# Patient Record
Sex: FEMALE | HISPANIC OR LATINO | Employment: FULL TIME | URBAN - NONMETROPOLITAN AREA
[De-identification: names, ages, dates, MRNs, and addresses within clinical notes are randomized per-mention and may not be internally consistent; named-entity substitution may affect disease eponyms.]

---

## 2020-09-16 ENCOUNTER — OFFICE VISIT (OUTPATIENT)
Dept: OTOLARYNGOLOGY | Facility: CLINIC | Age: 25
End: 2020-09-16

## 2020-09-16 VITALS
WEIGHT: 172.6 LBS | DIASTOLIC BLOOD PRESSURE: 70 MMHG | HEART RATE: 65 BPM | BODY MASS INDEX: 29.63 KG/M2 | SYSTOLIC BLOOD PRESSURE: 121 MMHG | TEMPERATURE: 97.4 F

## 2020-09-16 DIAGNOSIS — D48.9 NEOPLASM OF UNCERTAIN BEHAVIOR: Primary | ICD-10-CM

## 2020-09-16 PROCEDURE — 99203 OFFICE O/P NEW LOW 30 MIN: CPT | Performed by: NURSE PRACTITIONER

## 2020-09-16 RX ORDER — BUSPIRONE HYDROCHLORIDE 5 MG/1
5 TABLET ORAL 2 TIMES DAILY
COMMUNITY
Start: 2020-08-07 | End: 2021-12-21

## 2020-09-16 RX ORDER — DIPHENOXYLATE HYDROCHLORIDE AND ATROPINE SULFATE 2.5; .025 MG/1; MG/1
TABLET ORAL
COMMUNITY
Start: 2020-08-20 | End: 2021-12-21

## 2020-09-16 RX ORDER — CHOLECALCIFEROL (VITAMIN D3) 1250 MCG
CAPSULE ORAL
COMMUNITY
Start: 2020-09-09 | End: 2021-12-21

## 2020-09-16 RX ORDER — OMEPRAZOLE 20 MG/1
40 CAPSULE, DELAYED RELEASE ORAL DAILY
COMMUNITY
Start: 2020-09-10 | End: 2022-04-29 | Stop reason: SDUPTHER

## 2020-10-01 NOTE — PROGRESS NOTES
PRIMARY CARE PROVIDER: Hiral Manzo MD  REFERRING PROVIDER: No ref. provider found    Chief Complaint   Patient presents with   • Skin Lesion       Subjective   History of Present Illness:  Adelaida Mendez is a  24 y.o. female who presents for consultation regarding a skin lesion of the left jawline.  The lesion has the following characteristics:    Location: left jawline  Quality: raised, enlarging  Severity: moderate  Duration: many years  Timing: constant  Modifying Factors: none  Associated Signs & Symptoms: no change in color, no bleeding, no lymphadenopathy    Review of Systems:  Review of Systems   Constitutional: Negative for chills and fever.   HENT: Negative for congestion, ear discharge, ear pain, rhinorrhea and sore throat.    Respiratory: Negative for cough and shortness of breath.    Cardiovascular: Negative for chest pain.   Gastrointestinal: Negative for diarrhea, nausea and vomiting.   Hematological: Does not bruise/bleed easily.       Past History:  History reviewed. No pertinent past medical history.  Past Surgical History:   Procedure Laterality Date   • KIDNEY SURGERY      Removal   • MYRINGOTOMY W/ TUBES       Family History   Problem Relation Age of Onset   • Diabetes Father    • Hyperlipidemia Father      Social History     Tobacco Use   • Smoking status: Never Smoker   • Smokeless tobacco: Never Used   Substance Use Topics   • Alcohol use: Yes   • Drug use: Defer     Allergies:  Patient has no known allergies.    Current Outpatient Medications:   •  busPIRone (BUSPAR) 5 MG tablet, Take 5 mg by mouth 2 (Two) Times a Day., Disp: , Rfl:   •  Cholecalciferol (Vitamin D3) 1.25 MG (18933 UT) capsule, TK 1 C PO ONCE A WEEK, Disp: , Rfl:   •  diphenoxylate-atropine (LOMOTIL) 2.5-0.025 MG per tablet, TAKE ONE TABLET BY MOUTH AFTER LOOSE STOOL PRN, Disp: , Rfl:   •  omeprazole (priLOSEC) 20 MG capsule, TK 1 C PO QD, Disp: , Rfl:     Objective     Vital Signs:    /70   Pulse 65   Temp  97.4 °F (36.3 °C) (Temporal)   Wt 78.3 kg (172 lb 9.6 oz)   BMI 29.63 kg/m²     Physical Exam:  Physical Exam  Vitals signs and nursing note reviewed.   Constitutional:       General: She is not in acute distress.     Appearance: She is well-developed. She is not diaphoretic.   HENT:      Head: Normocephalic and atraumatic.        Right Ear: External ear normal.      Left Ear: External ear normal.      Nose: Nose normal.   Eyes:      General: No scleral icterus.        Right eye: No discharge.         Left eye: No discharge.      Conjunctiva/sclera: Conjunctivae normal.      Pupils: Pupils are equal, round, and reactive to light.   Neck:      Musculoskeletal: Normal range of motion and neck supple.      Thyroid: No thyromegaly.      Vascular: No JVD.      Trachea: No tracheal deviation.   Pulmonary:      Effort: Pulmonary effort is normal.      Breath sounds: No stridor.   Musculoskeletal: Normal range of motion.         General: No deformity.   Lymphadenopathy:      Cervical: No cervical adenopathy.   Skin:     General: Skin is warm and dry.      Coloration: Skin is not pale.      Findings: No erythema or rash.   Neurological:      Mental Status: She is alert and oriented to person, place, and time.      Cranial Nerves: No cranial nerve deficit.      Coordination: Coordination normal.   Psychiatric:         Speech: Speech normal.         Behavior: Behavior normal. Behavior is cooperative.         Thought Content: Thought content normal.         Judgment: Judgment normal.               Assessment   Assessment:  1. Neoplasm of uncertain behavior        Plan   Plan:    I have offered excision of the skin lesion of the left jawline in the OR under sedation with permanent section analysis and likely linear closure.    Discussion of skin lesion. Discussed risks, benefits, alternatives, and possible complications of excision of the skin lesion with reconstruction utilizing local tissue rearrangement, full-thickness skin  grafting, or local interpolated flaps. Risks include, but are not limited too: bleeding, infection, hematoma, recurrence, need for additional procedures, flap failure, cosmetic deformity. Patient understands risks and would like to proceed with surgery.     My findings and recommendations were discussed and questions were answered.     Babs Uribe, APRN

## 2020-10-02 PROBLEM — D48.9 NEOPLASM OF UNCERTAIN BEHAVIOR: Status: ACTIVE | Noted: 2020-10-02

## 2020-11-05 ENCOUNTER — APPOINTMENT (OUTPATIENT)
Dept: PREADMISSION TESTING | Facility: HOSPITAL | Age: 25
End: 2020-11-05

## 2020-11-05 ENCOUNTER — TRANSCRIBE ORDERS (OUTPATIENT)
Dept: ADMINISTRATIVE | Facility: HOSPITAL | Age: 25
End: 2020-11-05

## 2020-11-05 VITALS
BODY MASS INDEX: 30.7 KG/M2 | HEIGHT: 63 IN | SYSTOLIC BLOOD PRESSURE: 125 MMHG | WEIGHT: 173.28 LBS | HEART RATE: 76 BPM | DIASTOLIC BLOOD PRESSURE: 75 MMHG | OXYGEN SATURATION: 98 % | RESPIRATION RATE: 16 BRPM

## 2020-11-05 DIAGNOSIS — Z11.59 SCREENING FOR VIRAL DISEASE: Primary | ICD-10-CM

## 2020-11-05 LAB
DEPRECATED RDW RBC AUTO: 36.8 FL (ref 37–54)
ERYTHROCYTE [DISTWIDTH] IN BLOOD BY AUTOMATED COUNT: 12.9 % (ref 12.3–15.4)
HCT VFR BLD AUTO: 39.4 % (ref 34–46.6)
HGB BLD-MCNC: 13.6 G/DL (ref 12–15.9)
MCH RBC QN AUTO: 27.6 PG (ref 26.6–33)
MCHC RBC AUTO-ENTMCNC: 34.5 G/DL (ref 31.5–35.7)
MCV RBC AUTO: 79.9 FL (ref 79–97)
PLATELET # BLD AUTO: 315 10*3/MM3 (ref 140–450)
PMV BLD AUTO: 9.7 FL (ref 6–12)
RBC # BLD AUTO: 4.93 10*6/MM3 (ref 3.77–5.28)
WBC # BLD AUTO: 5.87 10*3/MM3 (ref 3.4–10.8)

## 2020-11-05 PROCEDURE — 36415 COLL VENOUS BLD VENIPUNCTURE: CPT

## 2020-11-05 PROCEDURE — 85027 COMPLETE CBC AUTOMATED: CPT | Performed by: OTOLARYNGOLOGY

## 2020-11-05 RX ORDER — VORTIOXETINE 10 MG/1
1 TABLET, FILM COATED ORAL DAILY
COMMUNITY
End: 2021-12-21

## 2020-11-05 RX ORDER — ASPIRIN 81 MG/1
81 TABLET, CHEWABLE ORAL AS NEEDED
COMMUNITY
End: 2021-12-21

## 2020-11-05 NOTE — DISCHARGE INSTRUCTIONS
DAY OF SURGERY INSTRUCTIONS        YOUR SURGEON: Dr Aleman    PROCEDURE: Excision of lesion of the left jawline with linear closure    DATE OF SURGERY: Nov, 12, 2020    ARRIVAL TIME: AS DIRECTED BY OFFICE    YOU MAY TAKE THE FOLLOWING MEDICATION(S) THE MORNING OF SURGERY WITH A SIP OF WATER: None    ALL OTHER HOME MEDICATIONS CHECK WITH YOUR DOCTOR                  MANAGING PAIN AFTER SURGERY    We know you are probably wondering what your pain will be like after surgery.  Following surgery it is unrealistic to expect you will not have pain.   Pain is how our bodies let us know that something is wrong or cautions us to be careful.  That said, our goal is to make your pain tolerable.    Methods we may use to treat your pain include (oral or IV medications, PCAs, epidurals, nerve blocks, etc.)   While some procedures require IV pain medications for a short time after surgery, transitioning to pain medications by mouth allows for better management of pain.   Your nurse will encourage you to take oral pain medications whenever possible.  IV medications work almost immediately, but only last a short while.  Taking medications by mouth allows for a more constant level of medication in your blood stream for a longer period of time.      Once your pain is out of control it is harder to get back under control.  It is important you are aware when your next dose of pain medication is due.  If you are admitted, your nurse may write the time of your next dose on the white board in your room to help you remember.      We are interested in your pain and encourage you to inform us about aggravating factors during your visit.   Many times a simple repositioning every few hours can make a big difference.    If your physician says it is okay, do not let your pain prevent you from getting out of bed. Be sure to call your nurse for assistance prior to getting up so you do not fall.      Before surgery, please decide your tolerable pain  goal.  These faces help describe the pain ratings we use on a 0-10 scale.   Be prepared to tell us your goal and whether or not you take pain or anxiety medications at home.      BEFORE YOU COME TO THE HOSPITAL  (Pre-op instructions)  • Do not eat, drink, smoke or chew gum after midnight the night before surgery.  This also includes no mints.  • Morning of surgery take only the medicines you have been instructed with a sip of water unless otherwise instructed  by your physician.  • Do not shave, wear makeup or dark nail polish.  • Remove all jewelry including rings.  • Leave anything you consider valuable at home.  • Leave your suitcase in the car until after your surgery.  • Bring the following with you if applicable:  o Picture ID and insurance, Medicare or Medicaid cards  o Co-pay/deductible required by insurance (cash, check, credit card)  o Copy of advance directive, living will or power-of- documents if not brought to PAT  o CPAP or BIPAP mask and tubing  o Relaxation aids ( book, magazine), etc.  o Hearing aids                                 ON THE DAY OF SURGERY  · On the day of surgery check in at registration located at the main entrance of the hospital.   ? You will be registered and given a beeper with instructions where to wait in the main lobby.  ? When your beeper lights up and vibrates a member of the Outpatient Surgery staff will meet you at the double doors under the stair steps and escort you to your preoperative room.   · You may have cloth compression devices placed on your legs. These help to prevent blood clots and reduce swelling in your legs.  · An IV may be inserted into one of your veins.  · In the operating room, you may be given one or more of the following:  ? A medicine to help you relax (sedative).  ? A medicine to numb the area (local anesthetic).  ? A medicine to make you fall asleep (general anesthetic).  ? A medicine that is injected into an area of your body to numb  "everything below the injection site (regional anesthetic).  · Your surgical site will be marked or identified.  · You may be given an antibiotic through your IV to help prevent infection.  Contact a health care provider if you:  · Develop a fever of more than 100.4°F (38°C) or other feelings of illness during the 48 hours before your surgery.  · Have symptoms that get worse.  Have questions or concerns about your surgery    General Anesthesia/Surgery, Adult  General anesthesia is the use of medicines to make a person \"go to sleep\" (unconscious) for a medical procedure. General anesthesia must be used for certain procedures, and is often recommended for procedures that:  · Last a long time.  · Require you to be still or in an unusual position.  · Are major and can cause blood loss.  The medicines used for general anesthesia are called general anesthetics. As well as making you unconscious for a certain amount of time, these medicines:  · Prevent pain.  · Control your blood pressure.  · Relax your muscles.  Tell a health care provider about:  · Any allergies you have.  · All medicines you are taking, including vitamins, herbs, eye drops, creams, and over-the-counter medicines.  · Any problems you or family members have had with anesthetic medicines.  · Types of anesthetics you have had in the past.  · Any blood disorders you have.  · Any surgeries you have had.  · Any medical conditions you have.  · Any recent upper respiratory, chest, or ear infections.  · Any history of:  ? Heart or lung conditions, such as heart failure, sleep apnea, asthma, or chronic obstructive pulmonary disease (COPD).  ?  service.  ? Depression or anxiety.  · Any tobacco or drug use, including marijuana or alcohol use.  · Whether you are pregnant or may be pregnant.  What are the risks?  Generally, this is a safe procedure. However, problems may occur, including:  · Allergic reaction.  · Lung and heart problems.  · Inhaling food or " liquid from the stomach into the lungs (aspiration).  · Nerve injury.  · Air in the bloodstream, which can lead to stroke.  · Extreme agitation or confusion (delirium) when you wake up from the anesthetic.  · Waking up during your procedure and being unable to move. This is rare.  These problems are more likely to develop if you are having a major surgery or if you have an advanced or serious medical condition. You can prevent some of these complications by answering all of your health care provider's questions thoroughly and by following all instructions before your procedure.  General anesthesia can cause side effects, including:  · Nausea or vomiting.  · A sore throat from the breathing tube.  · Hoarseness.  · Wheezing or coughing.  · Shaking chills.  · Tiredness.  · Body aches.  · Anxiety.  · Sleepiness or drowsiness.  · Confusion or agitation.  RISKS AND COMPLICATIONS OF SURGERY  Your health care provider will discuss possible risks and complications with you before surgery. Common risks and complications include:    · Problems due to the use of anesthetics.  · Blood loss and replacement (does not apply to minor surgical procedures).  · Temporary increase in pain due to surgery.  · Uncorrected pain or problems that the surgery was meant to correct.  · Infection.  · New damage.    What happens before the procedure?    Medicines  Ask your health care provider about:  · Changing or stopping your regular medicines. This is especially important if you are taking diabetes medicines or blood thinners.  · Taking medicines such as aspirin and ibuprofen. These medicines can thin your blood. Do not take these medicines unless your health care provider tells you to take them.  · Taking over-the-counter medicines, vitamins, herbs, and supplements. Do not take these during the week before your procedure unless your health care provider approves them.  General instructions  · Starting 3-6 weeks before the procedure, do not  use any products that contain nicotine or tobacco, such as cigarettes and e-cigarettes. If you need help quitting, ask your health care provider.  · If you brush your teeth on the morning of the procedure, make sure to spit out all of the toothpaste.  · Tell your health care provider if you become ill or develop a cold, cough, or fever.  · If instructed by your health care provider, bring your sleep apnea device with you on the day of your surgery (if applicable).  · Ask your health care provider if you will be going home the same day, the following day, or after a longer hospital stay.  ? Plan to have someone take you home from the hospital or clinic.  ? Plan to have a responsible adult care for you for at least 24 hours after you leave the hospital or clinic. This is important.  What happens during the procedure?  · You will be given anesthetics through both of the following:  ? A mask placed over your nose and mouth.  ? An IV in one of your veins.  · You may receive a medicine to help you relax (sedative).  · After you are unconscious, a breathing tube may be inserted down your throat to help you breathe. This will be removed before you wake up.  · An anesthesia specialist will stay with you throughout your procedure. He or she will:  ? Keep you comfortable and safe by continuing to give you medicines and adjusting the amount of medicine that you get.  ? Monitor your blood pressure, pulse, and oxygen levels to make sure that the anesthetics do not cause any problems.  The procedure may vary among health care providers and hospitals.  What happens after the procedure?  · Your blood pressure, temperature, heart rate, breathing rate, and blood oxygen level will be monitored until the medicines you were given have worn off.  · You will wake up in a recovery area. You may wake up slowly.  · If you feel anxious or agitated, you may be given medicine to help you calm down.  · If you will be going home the same day, your  health care provider may check to make sure you can walk, drink, and urinate.  · Your health care provider will treat any pain or side effects you have before you go home.  · Do not drive for 24 hours if you were given a sedative.  Summary  · General anesthesia is used to keep you still and prevent pain during a procedure.  · It is important to tell your healthcare provider about your medical history and any surgeries you have had, and previous experience with anesthesia.  · Follow your healthcare provider’s instructions about when to stop eating, drinking, or taking certain medicines before your procedure.  · Plan to have someone take you home from the hospital or clinic.  This information is not intended to replace advice given to you by your health care provider. Make sure you discuss any questions you have with your health care provider.  Document Released: 03/26/2009 Document Revised: 08/03/2018 Document Reviewed: 08/03/2018  [employer] Interactive Patient Education © 2019 [employer]      Fall Prevention in Hospitals, Adult  As a hospital patient, your condition and the treatments you receive can increase your risk for falls. Some additional risk factors for falls in a hospital include:  · Being in an unfamiliar environment.  · Being on bed rest.  · Your surgery.  · Taking certain medicines.  · Your tubing requirements, such as intravenous (IV) therapy or catheters.  It is important that you learn how to decrease fall risks while at the hospital. Below are important tips that can help prevent falls.  SAFETY TIPS FOR PREVENTING FALLS  Talk about your risk of falling.  · Ask your health care provider why you are at risk for falling. Is it your medicine, illness, tubing placement, or something else?  · Make a plan with your health care provider to keep you safe from falls.  · Ask your health care provider or pharmacist about side effects of your medicines. Some medicines can make you dizzy or affect your  coordination.  Ask for help.  · Ask for help before getting out of bed. You may need to press your call button.  · Ask for assistance in getting safely to the toilet.  · Ask for a walker or cane to be put at your bedside. Ask that most of the side rails on your bed be placed up before your health care provider leaves the room.  · Ask family or friends to sit with you.  · Ask for things that are out of your reach, such as your glasses, hearing aids, telephone, bedside table, or call button.  Follow these tips to avoid falling:  · Stay lying or seated, rather than standing, while waiting for help.  · Wear rubber-soled slippers or shoes whenever you walk in the hospital.  · Avoid quick, sudden movements.  ¨ Change positions slowly.  ¨ Sit on the side of your bed before standing.  ¨ Stand up slowly and wait before you start to walk.  · Let your health care provider know if there is a spill on the floor.  · Pay careful attention to the medical equipment, electrical cords, and tubes around you.  · When you need help, use your call button by your bed or in the bathroom. Wait for one of your health care providers to help you.  · If you feel dizzy or unsure of your footing, return to bed and wait for assistance.  · Avoid being distracted by the TV, telephone, or another person in your room.  · Do not lean or support yourself on rolling objects, such as IV poles or bedside tables.     This information is not intended to replace advice given to you by your health care provider. Make sure you discuss any questions you have with your health care provider.     Document Released: 12/15/2001 Document Revised: 01/08/2016 Document Reviewed: 08/25/2013  [employer] Interactive Patient Education ©2016 [employer]

## 2020-11-09 ENCOUNTER — LAB (OUTPATIENT)
Dept: LAB | Facility: HOSPITAL | Age: 25
End: 2020-11-09

## 2020-11-09 PROCEDURE — C9803 HOPD COVID-19 SPEC COLLECT: HCPCS | Performed by: OTOLARYNGOLOGY

## 2020-11-09 PROCEDURE — U0003 INFECTIOUS AGENT DETECTION BY NUCLEIC ACID (DNA OR RNA); SEVERE ACUTE RESPIRATORY SYNDROME CORONAVIRUS 2 (SARS-COV-2) (CORONAVIRUS DISEASE [COVID-19]), AMPLIFIED PROBE TECHNIQUE, MAKING USE OF HIGH THROUGHPUT TECHNOLOGIES AS DESCRIBED BY CMS-2020-01-R: HCPCS | Performed by: OTOLARYNGOLOGY

## 2020-11-10 LAB
COVID LABCORP PRIORITY: NORMAL
SARS-COV-2 RNA RESP QL NAA+PROBE: NOT DETECTED

## 2020-11-12 ENCOUNTER — HOSPITAL ENCOUNTER (OUTPATIENT)
Facility: HOSPITAL | Age: 25
Setting detail: HOSPITAL OUTPATIENT SURGERY
Discharge: HOME | End: 2020-11-12
Attending: OTOLARYNGOLOGY | Admitting: OTOLARYNGOLOGY

## 2020-11-12 ENCOUNTER — ANESTHESIA EVENT (OUTPATIENT)
Dept: PERIOP | Facility: HOSPITAL | Age: 25
End: 2020-11-12

## 2020-11-12 ENCOUNTER — ANESTHESIA (OUTPATIENT)
Dept: PERIOP | Facility: HOSPITAL | Age: 25
End: 2020-11-12

## 2020-11-12 VITALS
HEART RATE: 73 BPM | OXYGEN SATURATION: 98 % | SYSTOLIC BLOOD PRESSURE: 130 MMHG | RESPIRATION RATE: 18 BRPM | DIASTOLIC BLOOD PRESSURE: 83 MMHG | TEMPERATURE: 97 F

## 2020-11-12 DIAGNOSIS — D48.9 NEOPLASM OF UNCERTAIN BEHAVIOR: ICD-10-CM

## 2020-11-12 LAB — B-HCG UR QL: NEGATIVE

## 2020-11-12 PROCEDURE — 11441 EXC FACE-MM B9+MARG 0.6-1 CM: CPT | Performed by: OTOLARYNGOLOGY

## 2020-11-12 PROCEDURE — 25010000002 DEXAMETHASONE PER 1 MG: Performed by: ANESTHESIOLOGY

## 2020-11-12 PROCEDURE — 25010000002 ONDANSETRON PER 1 MG: Performed by: NURSE ANESTHETIST, CERTIFIED REGISTERED

## 2020-11-12 PROCEDURE — 25010000002 MIDAZOLAM PER 1 MG: Performed by: ANESTHESIOLOGY

## 2020-11-12 PROCEDURE — 88305 TISSUE EXAM BY PATHOLOGIST: CPT | Performed by: OTOLARYNGOLOGY

## 2020-11-12 PROCEDURE — 81025 URINE PREGNANCY TEST: CPT | Performed by: OTOLARYNGOLOGY

## 2020-11-12 PROCEDURE — 25010000002 PROPOFOL 10 MG/ML EMULSION: Performed by: NURSE ANESTHETIST, CERTIFIED REGISTERED

## 2020-11-12 PROCEDURE — 13131 CMPLX RPR F/C/C/M/N/AX/G/H/F: CPT | Performed by: OTOLARYNGOLOGY

## 2020-11-12 RX ORDER — SODIUM CHLORIDE 0.9 % (FLUSH) 0.9 %
3 SYRINGE (ML) INJECTION AS NEEDED
Status: DISCONTINUED | OUTPATIENT
Start: 2020-11-12 | End: 2020-11-12 | Stop reason: HOSPADM

## 2020-11-12 RX ORDER — ONDANSETRON 2 MG/ML
4 INJECTION INTRAMUSCULAR; INTRAVENOUS ONCE AS NEEDED
Status: DISCONTINUED | OUTPATIENT
Start: 2020-11-12 | End: 2020-11-12 | Stop reason: HOSPADM

## 2020-11-12 RX ORDER — IBUPROFEN 600 MG/1
600 TABLET ORAL EVERY 6 HOURS PRN
Status: DISCONTINUED | OUTPATIENT
Start: 2020-11-12 | End: 2020-11-12 | Stop reason: HOSPADM

## 2020-11-12 RX ORDER — FENTANYL CITRATE 50 UG/ML
25 INJECTION, SOLUTION INTRAMUSCULAR; INTRAVENOUS
Status: DISCONTINUED | OUTPATIENT
Start: 2020-11-12 | End: 2020-11-12 | Stop reason: HOSPADM

## 2020-11-12 RX ORDER — ACETAMINOPHEN 500 MG
1000 TABLET ORAL ONCE
Status: COMPLETED | OUTPATIENT
Start: 2020-11-12 | End: 2020-11-12

## 2020-11-12 RX ORDER — BUPIVACAINE HCL/0.9 % NACL/PF 0.1 %
2 PLASTIC BAG, INJECTION (ML) EPIDURAL ONCE
Status: COMPLETED | OUTPATIENT
Start: 2020-11-12 | End: 2020-11-12

## 2020-11-12 RX ORDER — LABETALOL HYDROCHLORIDE 5 MG/ML
5 INJECTION, SOLUTION INTRAVENOUS
Status: DISCONTINUED | OUTPATIENT
Start: 2020-11-12 | End: 2020-11-12 | Stop reason: HOSPADM

## 2020-11-12 RX ORDER — SODIUM CHLORIDE, SODIUM LACTATE, POTASSIUM CHLORIDE, CALCIUM CHLORIDE 600; 310; 30; 20 MG/100ML; MG/100ML; MG/100ML; MG/100ML
100 INJECTION, SOLUTION INTRAVENOUS CONTINUOUS
Status: DISCONTINUED | OUTPATIENT
Start: 2020-11-12 | End: 2020-11-12 | Stop reason: HOSPADM

## 2020-11-12 RX ORDER — EPHEDRINE SULFATE 50 MG/ML
INJECTION, SOLUTION INTRAVENOUS AS NEEDED
Status: DISCONTINUED | OUTPATIENT
Start: 2020-11-12 | End: 2020-11-12 | Stop reason: SURG

## 2020-11-12 RX ORDER — DEXAMETHASONE SODIUM PHOSPHATE 4 MG/ML
4 INJECTION, SOLUTION INTRA-ARTICULAR; INTRALESIONAL; INTRAMUSCULAR; INTRAVENOUS; SOFT TISSUE ONCE AS NEEDED
Status: COMPLETED | OUTPATIENT
Start: 2020-11-12 | End: 2020-11-12

## 2020-11-12 RX ORDER — NALOXONE HCL 0.4 MG/ML
0.4 VIAL (ML) INJECTION AS NEEDED
Status: DISCONTINUED | OUTPATIENT
Start: 2020-11-12 | End: 2020-11-12 | Stop reason: HOSPADM

## 2020-11-12 RX ORDER — LIDOCAINE HYDROCHLORIDE AND EPINEPHRINE 10; 10 MG/ML; UG/ML
INJECTION, SOLUTION INFILTRATION; PERINEURAL AS NEEDED
Status: DISCONTINUED | OUTPATIENT
Start: 2020-11-12 | End: 2020-11-12 | Stop reason: HOSPADM

## 2020-11-12 RX ORDER — LIDOCAINE HYDROCHLORIDE 10 MG/ML
0.5 INJECTION, SOLUTION EPIDURAL; INFILTRATION; INTRACAUDAL; PERINEURAL ONCE AS NEEDED
Status: DISCONTINUED | OUTPATIENT
Start: 2020-11-12 | End: 2020-11-12 | Stop reason: HOSPADM

## 2020-11-12 RX ORDER — LIDOCAINE HYDROCHLORIDE 10 MG/ML
0.5 INJECTION, SOLUTION EPIDURAL; INFILTRATION; INTRACAUDAL; PERINEURAL ONCE AS NEEDED
Status: DISCONTINUED | OUTPATIENT
Start: 2020-11-12 | End: 2020-11-12 | Stop reason: SDUPTHER

## 2020-11-12 RX ORDER — GINSENG 100 MG
CAPSULE ORAL AS NEEDED
Status: DISCONTINUED | OUTPATIENT
Start: 2020-11-12 | End: 2020-11-12 | Stop reason: HOSPADM

## 2020-11-12 RX ORDER — MIDAZOLAM HYDROCHLORIDE 1 MG/ML
1 INJECTION INTRAMUSCULAR; INTRAVENOUS
Status: DISCONTINUED | OUTPATIENT
Start: 2020-11-12 | End: 2020-11-12 | Stop reason: HOSPADM

## 2020-11-12 RX ORDER — SODIUM CHLORIDE, SODIUM LACTATE, POTASSIUM CHLORIDE, CALCIUM CHLORIDE 600; 310; 30; 20 MG/100ML; MG/100ML; MG/100ML; MG/100ML
1000 INJECTION, SOLUTION INTRAVENOUS CONTINUOUS
Status: DISCONTINUED | OUTPATIENT
Start: 2020-11-12 | End: 2020-11-12 | Stop reason: HOSPADM

## 2020-11-12 RX ORDER — OXYCODONE AND ACETAMINOPHEN 7.5; 325 MG/1; MG/1
2 TABLET ORAL EVERY 4 HOURS PRN
Status: DISCONTINUED | OUTPATIENT
Start: 2020-11-12 | End: 2020-11-12 | Stop reason: HOSPADM

## 2020-11-12 RX ORDER — MAGNESIUM HYDROXIDE 1200 MG/15ML
LIQUID ORAL AS NEEDED
Status: DISCONTINUED | OUTPATIENT
Start: 2020-11-12 | End: 2020-11-12 | Stop reason: HOSPADM

## 2020-11-12 RX ORDER — SODIUM CHLORIDE 0.9 % (FLUSH) 0.9 %
3 SYRINGE (ML) INJECTION EVERY 12 HOURS SCHEDULED
Status: DISCONTINUED | OUTPATIENT
Start: 2020-11-12 | End: 2020-11-12 | Stop reason: HOSPADM

## 2020-11-12 RX ORDER — PROPOFOL 10 MG/ML
VIAL (ML) INTRAVENOUS AS NEEDED
Status: DISCONTINUED | OUTPATIENT
Start: 2020-11-12 | End: 2020-11-12 | Stop reason: SURG

## 2020-11-12 RX ORDER — HYDROCODONE BITARTRATE AND ACETAMINOPHEN 7.5; 325 MG/1; MG/1
1 TABLET ORAL ONCE AS NEEDED
Status: DISCONTINUED | OUTPATIENT
Start: 2020-11-12 | End: 2020-11-12 | Stop reason: HOSPADM

## 2020-11-12 RX ORDER — OXYCODONE AND ACETAMINOPHEN 10; 325 MG/1; MG/1
1 TABLET ORAL ONCE AS NEEDED
Status: DISCONTINUED | OUTPATIENT
Start: 2020-11-12 | End: 2020-11-12 | Stop reason: HOSPADM

## 2020-11-12 RX ORDER — FAMOTIDINE 10 MG/ML
20 INJECTION, SOLUTION INTRAVENOUS
Status: COMPLETED | OUTPATIENT
Start: 2020-11-12 | End: 2020-11-12

## 2020-11-12 RX ORDER — ONDANSETRON 2 MG/ML
INJECTION INTRAMUSCULAR; INTRAVENOUS AS NEEDED
Status: DISCONTINUED | OUTPATIENT
Start: 2020-11-12 | End: 2020-11-12 | Stop reason: SURG

## 2020-11-12 RX ORDER — SODIUM CHLORIDE 0.9 % (FLUSH) 0.9 %
3-10 SYRINGE (ML) INJECTION AS NEEDED
Status: DISCONTINUED | OUTPATIENT
Start: 2020-11-12 | End: 2020-11-12 | Stop reason: HOSPADM

## 2020-11-12 RX ORDER — FLUMAZENIL 0.1 MG/ML
0.2 INJECTION INTRAVENOUS AS NEEDED
Status: DISCONTINUED | OUTPATIENT
Start: 2020-11-12 | End: 2020-11-12 | Stop reason: HOSPADM

## 2020-11-12 RX ORDER — IBUPROFEN 600 MG/1
600 TABLET ORAL ONCE AS NEEDED
Status: DISCONTINUED | OUTPATIENT
Start: 2020-11-12 | End: 2020-11-12 | Stop reason: HOSPADM

## 2020-11-12 RX ADMIN — FAMOTIDINE 20 MG: 10 INJECTION INTRAVENOUS at 08:22

## 2020-11-12 RX ADMIN — SODIUM CHLORIDE, POTASSIUM CHLORIDE, SODIUM LACTATE AND CALCIUM CHLORIDE 1000 ML: 600; 310; 30; 20 INJECTION, SOLUTION INTRAVENOUS at 06:48

## 2020-11-12 RX ADMIN — ONDANSETRON HYDROCHLORIDE 4 MG: 2 SOLUTION INTRAMUSCULAR; INTRAVENOUS at 09:20

## 2020-11-12 RX ADMIN — DEXAMETHASONE SODIUM PHOSPHATE 4 MG: 4 INJECTION, SOLUTION INTRAMUSCULAR; INTRAVENOUS at 08:22

## 2020-11-12 RX ADMIN — DEXMEDETOMIDINE HYDROCHLORIDE 39.32 MCG: 4 INJECTION, SOLUTION INTRAVENOUS at 09:24

## 2020-11-12 RX ADMIN — PROPOFOL 200 MG: 10 INJECTION, EMULSION INTRAVENOUS at 09:20

## 2020-11-12 RX ADMIN — Medication 2 G: at 09:20

## 2020-11-12 RX ADMIN — EPHEDRINE SULFATE 25 MG: 50 INJECTION INTRAVENOUS at 09:24

## 2020-11-12 RX ADMIN — MIDAZOLAM 1 MG: 1 INJECTION INTRAMUSCULAR; INTRAVENOUS at 09:13

## 2020-11-12 RX ADMIN — LIDOCAINE HYDROCHLORIDE 100 MG: 20 INJECTION, SOLUTION INTRAVENOUS at 09:20

## 2020-11-12 RX ADMIN — ACETAMINOPHEN 1000 MG: 500 TABLET, FILM COATED ORAL at 08:22

## 2020-11-12 NOTE — ANESTHESIA PROCEDURE NOTES
Airway  Urgency: elective    Date/Time: 11/12/2020 9:21 AM  Airway not difficult    General Information and Staff    Patient location during procedure: OR  CRNA: MANDA Chapman CRNA    Indications and Patient Condition  Indications for airway management: airway protection    Preoxygenated: yes  MILS not maintained throughout  Mask difficulty assessment: 1 - vent by mask    Final Airway Details  Final airway type: supraglottic airway      Successful airway: classic  Size 4    Number of attempts at approach: 1  Assessment: lips, teeth, and gum same as pre-op and atraumatic intubation

## 2020-11-12 NOTE — H&P
Chief Complaint   Patient presents with   • Skin Lesion            Subjective      History of Present Illness:  Adelaida Mendez is a  24 y.o. female who presents for surgical excision of a skin lesion of the left jawline.  The lesion has the following characteristics:     Location: left jawline  Quality: raised, enlarging  Severity: moderate  Duration: many years  Timing: constant  Modifying Factors: none  Associated Signs & Symptoms: no change in color, no bleeding, no lymphadenopathy     Review of Systems:  Review of Systems   Constitutional: Negative for chills and fever.   HENT: Negative for congestion, ear discharge, ear pain, rhinorrhea and sore throat.    Respiratory: Negative for cough and shortness of breath.    Cardiovascular: Negative for chest pain.   Hematological: Does not bruise/bleed easily.   Skin: No keloid history       /71 (BP Location: Left arm, Patient Position: Sitting)   Pulse 56   Temp 96.8 °F (36 °C) (Temporal)   Resp 18   LMP 11/04/2020 (Exact Date) Comment: neg hcg  SpO2 100%   Breastfeeding No     Past Medical History:   Diagnosis Date   • GERD (gastroesophageal reflux disease)        Past Surgical History:   Procedure Laterality Date   • KIDNEY SURGERY      Removal   • MYRINGOTOMY W/ TUBES         Family History   Problem Relation Age of Onset   • Diabetes Father    • Hyperlipidemia Father        Social History     Socioeconomic History   • Marital status: Single     Spouse name: Not on file   • Number of children: Not on file   • Years of education: Not on file   • Highest education level: Not on file   Tobacco Use   • Smoking status: Never Smoker   • Smokeless tobacco: Never Used   Substance and Sexual Activity   • Alcohol use: Never     Frequency: Never   • Drug use: Never   • Sexual activity: Defer       No Known Allergies      Current Facility-Administered Medications:   •  ceFAZolin in 0.9% normal saline (ANCEF) IVPB solution 2 g, 2 g, Intravenous, Once, Babs Uribe  APRN  •  lactated ringers infusion 1,000 mL, 1,000 mL, Intravenous, Continuous, David Aleman MD, Last Rate: 25 mL/hr at 11/12/20 0648, 1,000 mL at 11/12/20 0648  •  lactated ringers infusion, 100 mL/hr, Intravenous, Continuous, Lizabeth Peña MD  •  lidocaine PF 1% (XYLOCAINE) injection 0.5 mL, 0.5 mL, Intradermal, Once PRN, David Aleman MD  •  midazolam (VERSED) injection 1 mg, 1 mg, Intravenous, Q10 Min PRN, Lizabeth Peña MD  •  sodium chloride 0.9 % flush 3 mL, 3 mL, Intravenous, PRN, David Aleman MD  •  sodium chloride 0.9 % flush 3 mL, 3 mL, Intravenous, Q12H, Lizabeth Peña MD  •  sodium chloride 0.9 % flush 3-10 mL, 3-10 mL, Intravenous, PRN, Lizabeth Peña MD       Allergies:  Patient has no known allergies.     Current Outpatient Medications:   •  busPIRone (BUSPAR) 5 MG tablet, Take 5 mg by mouth 2 (Two) Times a Day., Disp: , Rfl:   •  Cholecalciferol (Vitamin D3) 1.25 MG (44656 UT) capsule, TK 1 C PO ONCE A WEEK, Disp: , Rfl:   •  diphenoxylate-atropine (LOMOTIL) 2.5-0.025 MG per tablet, TAKE ONE TABLET BY MOUTH AFTER LOOSE STOOL PRN, Disp: , Rfl:   •  omeprazole (priLOSEC) 20 MG capsule, TK 1 C PO QD, Disp: , Rfl:         Objective         Vital Signs:  /71 (BP Location: Left arm, Patient Position: Sitting)   Pulse 56   Temp 96.8 °F (36 °C) (Temporal)   Resp 18   LMP 11/04/2020 (Exact Date) Comment: neg hcg  SpO2 100%   Breastfeeding No        Physical Exam:  Physical Exam  Vitals signs and nursing note reviewed.   Constitutional:       General: She is not in acute distress.     Appearance: She is well-developed. She is not diaphoretic.   HENT:      Head: Normocephalic and atraumatic.        Right Ear: External ear normal.      Left Ear: External ear normal.      Nose: Nose normal.   Eyes:      General: No scleral icterus.        Right eye: No discharge.         Left eye: No discharge.      Conjunctiva/sclera: Conjunctivae normal.      Pupils: Pupils  are equal, round, and reactive to light.   Neck:      Musculoskeletal: Normal range of motion and neck supple.      Thyroid: No thyromegaly.      Vascular: No JVD.      Trachea: No tracheal deviation.   Pulmonary:      Effort: Pulmonary effort is normal.      Breath sounds: No stridor.   Musculoskeletal: Normal range of motion.         General: No deformity.   Lymphadenopathy:      Cervical: No cervical adenopathy.   Skin:     General: Skin is warm and dry.  See photo below      Coloration: Skin is not pale.      Findings: No erythema or rash.   Neurological:      Mental Status: She is alert and oriented to person, place, and time.      Cranial Nerves: No cranial nerve deficit.      Coordination: Coordination normal.   Psychiatric:         Speech: Speech normal.         Behavior: Behavior normal. Behavior is cooperative.         Thought Content: Thought content normal.         Judgment: Judgment normal.                       Assessment      Assessment:  1. Neoplasm of uncertain behavior             Plan      Plan:     I have offered excision of the skin lesion of the left jawline in the OR under sedation with permanent section analysis and linear closure along thee jawline shadow.     Discussion of skin lesion. Discussed risks, benefits, alternatives, and possible complications of excision of the skin lesion with reconstruction utilizing local tissue rearrangement, full-thickness skin grafting, or local interpolated flaps. Risks include, but are not limited too: bleeding, infection, hematoma, recurrence, need for additional procedures, flap failure, cosmetic deformity. Patient understands risks and would like to proceed with surgery.      My findings and recommendations were discussed and questions were answered.     Electronically signed by David Aleman MD, 11/12/20, 9:07 AM CST.

## 2020-11-12 NOTE — OP NOTE
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 Mendez presented with a enlarging pigmented lesion of the left jawline.  She opted for surgical excision.    OPERATIVE FINDINGS:     Lesion: 7 mm x 5 mm  Margins: 1.5 mm  Defect: 8 mm x 17 mm  Depth: Through dermis  Closure Length: 19 mm    OPERATIVE DETAILS:     After patient verification and consent was obtained, the patient was taken the operating room laid supine on the operative table.  After the induction of general endotracheal anesthesia, the skin surrounding the lesion was infiltrated with 2 cc of 1% lidocaine with 1-100,000 epinephrine.  Patient was then sterilely prepped and draped.    The lesion was marked with the above listed margins.  This was converted to a fusiform-type incision.  The skin was incised utilizing a 15 blade and undermined in the immediate subcutaneous plane.  This was oriented with a stitch at 12:00, and sent for permanent section analysis.    Skin was widely undermined utilizing a fresh 15 blade in each direction for approximately 1 cm.   Hemostasis was obtained with bipolar cautery set at 15.    The skin was advanced, and closed utilizing 5-0 undyed Vicryl suture in buried fashion followed by interrupted 6-0 Prolene.    Antibiotic ointment was placed to the  incision.    DISPOSITION:  The procedures were completed without complication and tolerated well.  The patient was released in the company of her family to return home in satisfactory condition.  A follow-up appointment will be scheduled, routine post-op medications prescribed (if required), and post-op instructions were given to the responsible party.           David Aleman MD, FACS  Board Certified Facial Plastic and Reconstructive Surgery  Board Certified Otolaryngology -- Head and Neck Surgery  Electronically signed by David Aleman MD, 11/12/20, 9:50 AM CST.

## 2020-11-12 NOTE — DISCHARGE INSTRUCTIONS

## 2020-11-12 NOTE — ANESTHESIA POSTPROCEDURE EVALUATION
Patient: Adelaida Mendez    Procedure Summary     Date: 11/12/20 Room / Location:  [facility] OR  /  [facility]    Anesthesia Start: 0919 Anesthesia Stop: 0952    Procedure: Excision of lesion of the left jawline with linear closure (Left ) Diagnosis:       Neoplasm of uncertain behavior      (Neoplasm of uncertain behavior [D48.9])    Surgeon: David Aleman MD Provider: MANDA Chapman CRNA    Anesthesia Type: general ASA Status: 2          Anesthesia Type: general    Vitals  Vitals Value Taken Time   /77 11/12/20 1035   Temp 97 °F (36.1 °C) 11/12/20 0953   Pulse 65 11/12/20 1045   Resp 8 11/12/20 1000   SpO2 100 % 11/12/20 1045   Vitals shown include unvalidated device data.        Post Anesthesia Care and Evaluation    Patient location during evaluation: PACU  Patient participation: complete - patient participated  Level of consciousness: awake and alert  Pain management: adequate  Airway patency: patent  Anesthetic complications: No anesthetic complications    Cardiovascular status: acceptable  Respiratory status: acceptable  Hydration status: acceptable    Comments: Blood pressure 109/65, pulse 73, temperature 97 °F (36.1 °C), temperature source Temporal, resp. rate 18, last menstrual period 11/04/2020, SpO2 99 %, not currently breastfeeding.    Pt discharged from PACU based on shira score >8  No anesthesia care post op

## 2020-11-16 LAB
CYTO UR: NORMAL
LAB AP CASE REPORT: NORMAL
PATH REPORT.FINAL DX SPEC: NORMAL
PATH REPORT.GROSS SPEC: NORMAL

## 2020-11-19 ENCOUNTER — OFFICE VISIT (OUTPATIENT)
Dept: OTOLARYNGOLOGY | Facility: CLINIC | Age: 25
End: 2020-11-19

## 2020-11-19 VITALS — SYSTOLIC BLOOD PRESSURE: 120 MMHG | HEART RATE: 78 BPM | DIASTOLIC BLOOD PRESSURE: 68 MMHG | TEMPERATURE: 96.8 F

## 2020-11-19 DIAGNOSIS — D22.9 COMPOUND NEVUS: Primary | ICD-10-CM

## 2020-11-19 PROCEDURE — 99024 POSTOP FOLLOW-UP VISIT: CPT | Performed by: OTOLARYNGOLOGY

## 2020-11-19 NOTE — PROGRESS NOTES
CC/Reason for visit: Adelaida Mendez returns to the office following excision of a lesion of the left jawline with linear closure on November 12, 2020    SUBJECTIVE:  She is doing well without complaint.  She is denying any fevers or chills.  She denies any wound drainage.    OBJECTIVE:  /68   Pulse 78   Temp 96.8 °F (36 °C) (Temporal)   LMP 11/04/2020 (Exact Date)   Excision is healing well.  Sutures removed.  Pathology:      ASSESSMENT:  Diagnoses and all orders for this visit:    1. Compound nevus (Primary)        PLAN:        [facility], Post-Procedure Instructions:    Protect the incisions from sunlight. Sunlight to the incisions will cause permanent pigmentation to the incision line and make the incision more noticeable. After the incision has reepithelialized (typically 2-3 weeks after the procedure), you may begin to use sunscreen with an SPF of 15 or greater    For the first week after your procedure, apply a thin coat of Vaseline to the incision 3-4 times daily.  Starting the second week, use a silicone-based wound cream to the incisions to optimize the end result. Apply topically twice daily, or as directed, to help optimize wound healing and decrease redness.    Due to COVID-19, we have decreased the amount of postoperative checks to help prevent added exposure to the virus.  If you have any questions or concerns following her procedure, please give us a call.  We have the ability to schedule a video visit, phone visit, or follow-up visit to address any concerns, while decreasing your exposure to the hospital.  Many of your questions and concerns may be able to be answered over the phone.  Our direct line is [phone].    It is very important to continue routine skin checks with a dermatologist or your PCP every 6-12 months.     Follow-up with me again in 6 months.  Call with any questions or concerns.    David Aleman MD   11/19/2020  10:20 CST

## 2020-11-19 NOTE — PATIENT INSTRUCTIONS
CONTACT INFORMATION:  The main office phone number is [phone]. For emergencies after hours and on weekends, this number will convert over to our answering service and the on call provider will answer. Please try to keep non emergent phone calls/ questions to office hours 9am-5pm Monday through Friday.     [employer]  As an alternative, you can sign up and use the Epic MyChart system for more direct and quicker access for non emergent questions/ problems.  [facility] [employer] allows you to send messages to your doctor, view your test results, renew your prescriptions, schedule appointments, and more. To sign up, go to [employer] and click on the Sign Up Now link in the New User? box. Enter your [employer] Activation Code exactly as it appears below along with the last four digits of your Social Security Number and your Date of Birth () to complete the sign-up process. If you do not sign up before the expiration date, you must request a new code.    [employer] Activation Code: Activation code not generated  Current [employer] Status: Account disabled    If you have questions, you can email [email or web address] or call [phone] to talk to our [employer] staff. Remember, [employer] is NOT to be used for urgent needs. For medical emergencies, dial 911.

## 2021-05-17 ENCOUNTER — TRANSCRIBE ORDERS (OUTPATIENT)
Dept: ADMINISTRATIVE | Facility: HOSPITAL | Age: 26
End: 2021-05-17

## 2021-05-17 DIAGNOSIS — R10.2 PELVIC PAIN: Primary | ICD-10-CM

## 2021-05-20 ENCOUNTER — HOSPITAL ENCOUNTER (OUTPATIENT)
Dept: ULTRASOUND IMAGING | Facility: HOSPITAL | Age: 26
Discharge: HOME | End: 2021-05-20
Admitting: OBSTETRICS & GYNECOLOGY

## 2021-05-20 DIAGNOSIS — R10.2 PELVIC PAIN: ICD-10-CM

## 2021-05-20 PROCEDURE — 76856 US EXAM PELVIC COMPLETE: CPT

## 2021-06-08 ENCOUNTER — TELEPHONE (OUTPATIENT)
Dept: URGENT CARE | Facility: CLINIC | Age: 26
End: 2021-06-08

## 2021-06-08 DIAGNOSIS — J02.0 STREP THROAT: Primary | ICD-10-CM

## 2021-06-08 RX ORDER — AMOXICILLIN 500 MG/1
500 CAPSULE ORAL 2 TIMES DAILY
Qty: 20 CAPSULE | Refills: 0 | Status: SHIPPED | OUTPATIENT
Start: 2021-06-08 | End: 2021-06-18

## 2021-06-11 ENCOUNTER — TELEPHONE (OUTPATIENT)
Dept: URGENT CARE | Facility: CLINIC | Age: 26
End: 2021-06-11

## 2021-06-11 DIAGNOSIS — R10.2 PELVIC CRAMPING: Primary | ICD-10-CM

## 2021-06-11 NOTE — TELEPHONE ENCOUNTER
Patient call received.  Requesting trial on medication to help with menstrual cramps until she can be seen by her GYN.  Has appointment in July.

## 2021-06-12 ENCOUNTER — TRANSCRIBE ORDERS (OUTPATIENT)
Dept: URGENT CARE | Facility: CLINIC | Age: 26
End: 2021-06-12

## 2021-06-12 DIAGNOSIS — R25.2 MUSCLE CRAMPS: Primary | ICD-10-CM

## 2021-06-12 RX ORDER — METHOCARBAMOL 750 MG/1
750 TABLET, FILM COATED ORAL 3 TIMES DAILY PRN
Qty: 30 TABLET | Refills: 0 | Status: SHIPPED | OUTPATIENT
Start: 2021-06-12 | End: 2022-06-06

## 2021-08-03 ENCOUNTER — TELEPHONE (OUTPATIENT)
Dept: URGENT CARE | Facility: CLINIC | Age: 26
End: 2021-08-03

## 2021-08-03 DIAGNOSIS — H65.03 NON-RECURRENT ACUTE SEROUS OTITIS MEDIA OF BOTH EARS: Primary | ICD-10-CM

## 2021-08-03 RX ORDER — BROMPHENIRAMINE MALEATE, PSEUDOEPHEDRINE HYDROCHLORIDE, AND DEXTROMETHORPHAN HYDROBROMIDE 2; 30; 10 MG/5ML; MG/5ML; MG/5ML
5 SYRUP ORAL 4 TIMES DAILY PRN
Qty: 118 ML | Refills: 0 | Status: SHIPPED | OUTPATIENT
Start: 2021-08-03 | End: 2021-12-21

## 2021-08-09 ENCOUNTER — TRANSCRIBE ORDERS (OUTPATIENT)
Dept: ADMINISTRATIVE | Facility: HOSPITAL | Age: 26
End: 2021-08-09

## 2021-08-09 DIAGNOSIS — N28.1 RENAL CYST, LEFT: Primary | ICD-10-CM

## 2021-08-11 ENCOUNTER — TELEPHONE (OUTPATIENT)
Dept: URGENT CARE | Facility: CLINIC | Age: 26
End: 2021-08-11

## 2021-08-23 ENCOUNTER — TELEPHONE (OUTPATIENT)
Dept: URGENT CARE | Facility: CLINIC | Age: 26
End: 2021-08-23

## 2021-08-23 ENCOUNTER — LAB (OUTPATIENT)
Dept: LAB | Facility: HOSPITAL | Age: 26
End: 2021-08-23

## 2021-08-23 DIAGNOSIS — R11.0 NAUSEA: Primary | ICD-10-CM

## 2021-08-23 DIAGNOSIS — Z11.52 ENCOUNTER FOR SCREENING FOR COVID-19: Primary | ICD-10-CM

## 2021-08-23 DIAGNOSIS — Z11.52 ENCOUNTER FOR SCREENING FOR COVID-19: ICD-10-CM

## 2021-08-23 LAB — SARS-COV-2 RNA PNL SPEC NAA+PROBE: NOT DETECTED

## 2021-08-23 PROCEDURE — C9803 HOPD COVID-19 SPEC COLLECT: HCPCS

## 2021-08-23 PROCEDURE — 87635 SARS-COV-2 COVID-19 AMP PRB: CPT

## 2021-08-23 RX ORDER — ONDANSETRON 8 MG/1
8 TABLET, ORALLY DISINTEGRATING ORAL EVERY 8 HOURS PRN
Qty: 20 TABLET | Refills: 0 | Status: SHIPPED | OUTPATIENT
Start: 2021-08-23 | End: 2021-12-28

## 2021-08-26 ENCOUNTER — TELEPHONE (OUTPATIENT)
Dept: URGENT CARE | Facility: CLINIC | Age: 26
End: 2021-08-26

## 2021-08-26 DIAGNOSIS — R10.9 ABDOMINAL CRAMPING: Primary | ICD-10-CM

## 2021-08-26 RX ORDER — DICYCLOMINE HCL 20 MG
20 TABLET ORAL EVERY 6 HOURS PRN
Qty: 20 TABLET | Refills: 0 | Status: SHIPPED | OUTPATIENT
Start: 2021-08-26 | End: 2021-08-31

## 2021-12-01 DIAGNOSIS — R53.83 FATIGUE, UNSPECIFIED TYPE: Primary | ICD-10-CM

## 2021-12-02 LAB
T4 FREE SERPL-MCNC: 1.53 NG/DL (ref 0.93–1.7)
TSH SERPL DL<=0.005 MIU/L-ACNC: 0.4 UIU/ML (ref 0.27–4.2)

## 2021-12-04 LAB
25(OH)D3+25(OH)D2 SERPL-MCNC: 30.9 NG/ML
Lab: NORMAL
VIT B12 SERPL-MCNC: 603 PG/ML (ref 211–946)
WRITTEN AUTHORIZATION: NORMAL

## 2021-12-16 ENCOUNTER — LAB (OUTPATIENT)
Dept: LAB | Facility: HOSPITAL | Age: 26
End: 2021-12-16

## 2021-12-16 DIAGNOSIS — Z78.9 TRYING TO GET PREGNANT: Primary | ICD-10-CM

## 2021-12-16 DIAGNOSIS — Z78.9 TRYING TO GET PREGNANT: ICD-10-CM

## 2021-12-16 PROCEDURE — 36415 COLL VENOUS BLD VENIPUNCTURE: CPT

## 2021-12-16 PROCEDURE — 84703 CHORIONIC GONADOTROPIN ASSAY: CPT

## 2021-12-17 LAB — HCG SERPL QL: NEGATIVE

## 2021-12-21 ENCOUNTER — OFFICE VISIT (OUTPATIENT)
Dept: INTERNAL MEDICINE | Facility: CLINIC | Age: 26
End: 2021-12-21

## 2021-12-21 VITALS
DIASTOLIC BLOOD PRESSURE: 68 MMHG | BODY MASS INDEX: 29.44 KG/M2 | OXYGEN SATURATION: 99 % | TEMPERATURE: 98 F | HEIGHT: 62 IN | HEART RATE: 84 BPM | WEIGHT: 160 LBS | SYSTOLIC BLOOD PRESSURE: 110 MMHG

## 2021-12-21 DIAGNOSIS — F33.0 MILD EPISODE OF RECURRENT MAJOR DEPRESSIVE DISORDER (HCC): Primary | ICD-10-CM

## 2021-12-21 PROCEDURE — 99213 OFFICE O/P EST LOW 20 MIN: CPT | Performed by: NURSE PRACTITIONER

## 2021-12-21 RX ORDER — BUPROPION HYDROCHLORIDE 150 MG/1
150 TABLET ORAL DAILY
Qty: 30 TABLET | Refills: 0 | Status: SHIPPED | OUTPATIENT
Start: 2021-12-21 | End: 2022-06-06

## 2021-12-21 NOTE — PROGRESS NOTES
Subjective   Adelaida Blair is a 26 y.o. female.   Chief Complaint   Patient presents with   • Depression     wants Zoloft changed to something else. It is making her sleepy    • Leg Problem     both shins, fell a year ago on the edge of a chair.        Ms. Blair present to the office with worsening/unchanged depression.    Depression  Visit Type: follow-up  Patient presents with the following symptoms: depressed mood, excessive worry, fatigue, feelings of worthlessness, irritability, muscle tension and nervousness/anxiety.  Patient is not experiencing: confusion, decreased concentration, insomnia, palpitations, panic, restlessness, shortness of breath, suicidal ideas, suicidal planning, thoughts of death, weight gain and weight loss.  Frequency of symptoms: most days   Severity: moderate   Sleep quality: good  Nighttime awakenings: none  Compliance with medications:  %             The following portions of the patient's history were reviewed and updated as appropriate: allergies, current medications, past family history, past medical history, past social history, past surgical history and problem list.    Review of Systems   Constitutional: Positive for irritability. Negative for activity change, appetite change, fatigue, fever, unexpected weight gain and unexpected weight loss.   HENT: Negative for swollen glands, trouble swallowing and voice change.    Eyes: Negative for blurred vision and visual disturbance.   Respiratory: Negative for cough and shortness of breath.    Cardiovascular: Negative for chest pain, palpitations and leg swelling.   Gastrointestinal: Negative for abdominal pain, constipation, diarrhea, nausea, vomiting and indigestion.   Endocrine: Negative for cold intolerance, heat intolerance, polydipsia and polyphagia.   Genitourinary: Negative for dysuria and frequency.   Musculoskeletal: Negative for arthralgias, back pain, joint swelling and neck pain.   Skin: Negative for color change,  rash and skin lesions.   Neurological: Negative for dizziness, weakness, headache, memory problem and confusion.   Hematological: Does not bruise/bleed easily.   Psychiatric/Behavioral: Positive for depressed mood. Negative for agitation, decreased concentration, hallucinations and suicidal ideas. The patient is nervous/anxious. The patient does not have insomnia.        Objective   Past Medical History:   Diagnosis Date   • GERD (gastroesophageal reflux disease)       Past Surgical History:   Procedure Laterality Date   • HEAD/NECK LESION/CYST EXCISION Left 11/12/2020    Procedure: Excision of lesion of the left jawline with linear closure;  Surgeon: David Aleman MD;  Location: [facility];  Service: ENT;  Laterality: Left;   • KIDNEY SURGERY      Removal   • MYRINGOTOMY W/ TUBES          Current Outpatient Medications:   •  hyoscyamine (LEVSIN) 0.125 MG SL tablet, Take 1 tablet by mouth Every 4 (Four) Hours As Needed for Cramping., Disp: 30 tablet, Rfl: 0  •  methocarbamol (ROBAXIN) 750 MG tablet, Take 1 tablet by mouth 3 (Three) Times a Day As Needed (cramps)., Disp: 30 tablet, Rfl: 0  •  omeprazole (priLOSEC) 20 MG capsule, Take 40 mg by mouth Daily., Disp: , Rfl:   •  ondansetron ODT (ZOFRAN-ODT) 8 MG disintegrating tablet, Place 1 tablet on the tongue Every 8 (Eight) Hours As Needed for Nausea or Vomiting., Disp: 20 tablet, Rfl: 0  •  sertraline (ZOLOFT) 50 MG tablet, Take 50 mg by mouth 2 (Two) Times a Day., Disp: , Rfl:   •  buPROPion XL (Wellbutrin XL) 150 MG 24 hr tablet, Take 1 tablet by mouth Daily., Disp: 30 tablet, Rfl: 0      Vitals:    12/21/21 1331   BP: 110/68   Pulse: 84   Temp: 98 °F (36.7 °C)   SpO2: 99%         12/21/21  1331   Weight: 72.6 kg (160 lb)       Body mass index is 29.26 kg/m².    Physical Exam  Vitals and nursing note reviewed.   Constitutional:       Appearance: She is well-developed.   HENT:      Head: Normocephalic and atraumatic.      Right Ear: External ear normal.      Left  Ear: External ear normal.      Nose: Nose normal.   Eyes:      Conjunctiva/sclera: Conjunctivae normal.      Pupils: Pupils are equal, round, and reactive to light.   Neck:      Thyroid: No thyromegaly.   Cardiovascular:      Rate and Rhythm: Normal rate and regular rhythm.      Heart sounds: Normal heart sounds.   Pulmonary:      Effort: Pulmonary effort is normal.      Breath sounds: Normal breath sounds.   Abdominal:      General: Bowel sounds are normal.      Palpations: Abdomen is soft.   Musculoskeletal:      Cervical back: Normal range of motion and neck supple.   Lymphadenopathy:      Cervical: No cervical adenopathy.   Skin:     General: Skin is warm and dry.   Neurological:      Mental Status: She is alert and oriented to person, place, and time.   Psychiatric:         Attention and Perception: Attention normal.         Mood and Affect: Mood is anxious and depressed.         Speech: Speech normal.         Behavior: Behavior normal.         Thought Content: Thought content normal.         Cognition and Memory: Cognition and memory normal.         Judgment: Judgment normal.               Assessment/Plan   Diagnoses and all orders for this visit:    1. Mild episode of recurrent major depressive disorder (HCC) (Primary)  -     buPROPion XL (Wellbutrin XL) 150 MG 24 hr tablet; Take 1 tablet by mouth Daily.  Dispense: 30 tablet; Refill: 0      Will discontinue zoloft and start on wellbutrin today. Will follow up in 1 month to determine if medicine is helping with mood. Would encourage counseling while on this medicine to help with anxiety and depression symptoms. Educated her on the potential side effects.

## 2021-12-21 NOTE — PATIENT INSTRUCTIONS
Mindfulness-Based Stress Reduction  Mindfulness-based stress reduction (MBSR) is a program that helps people learn to practice mindfulness. Mindfulness is the practice of intentionally paying attention to the present moment. It can be learned and practiced through techniques such as education, breathing exercises, meditation, and yoga. MBSR includes several mindfulness techniques in one program.  MBSR works best when you understand the treatment, are willing to try new things, and can commit to spending time practicing what you learn. MBSR training may include learning about:  · How your emotions, thoughts, and reactions affect your body.  · New ways to respond to things that cause negative thoughts to start (triggers).  · How to notice your thoughts and let go of them.  · Practicing awareness of everyday things that you normally do without thinking.  · The techniques and goals of different types of meditation.  What are the benefits of MBSR?  MBSR can have many benefits, which include helping you to:  · Develop self-awareness. This refers to knowing and understanding yourself.  · Learn skills and attitudes that help you to participate in your own health care.  · Learn new ways to care for yourself.  · Be more accepting about how things are, and let things go.  · Be less judgmental and approach things with an open mind.  · Be patient with yourself and trust yourself more.  MBSR has also been shown to:  · Reduce negative emotions, such as depression and anxiety.  · Improve memory and focus.  · Change how you sense and approach pain.  · Boost your body's ability to fight infections.  · Help you connect better with other people.  · Improve your sense of well-being.  Follow these instructions at home:    · Find a local in-person or online MBSR program.  · Set aside some time regularly for mindfulness practice.  · Find a mindfulness practice that works best for you. This may include one or more of the  following:  ? Meditation. Meditation involves focusing your mind on a certain thought or activity.  ? Breathing awareness exercises. These help you to stay present by focusing on your breath.  ? Body scan. For this practice, you lie down and pay attention to each part of your body from head to toe. You can identify tension and soreness and intentionally relax parts of your body.  ? Yoga. Yoga involves stretching and breathing, and it can improve your ability to move and be flexible. It can also provide an experience of testing your body's limits, which can help you release stress.  ? Mindful eating. This way of eating involves focusing on the taste, texture, color, and smell of each bite of food. Because this slows down eating and helps you feel full sooner, it can be an important part of a weight-loss plan.  · Find a podcast or recording that provides guidance for breathing awareness, body scan, or meditation exercises. You can listen to these any time when you have a free moment to rest without distractions.  · Follow your treatment plan as told by your health care provider. This may include taking regular medicines and making changes to your diet or lifestyle as recommended.  How to practice mindfulness  To do a basic awareness exercise:  · Find a comfortable place to sit.  · Pay attention to the present moment. Observe your thoughts, feelings, and surroundings just as they are.  · Avoid placing judgment on yourself, your feelings, or your surroundings. Make note of any judgment that comes up, and let it go.  · Your mind may wander, and that is okay. Make note of when your thoughts drift, and return your attention to the present moment.  To do basic mindfulness meditation:  · Find a comfortable place to sit. This may include a stable chair or a firm floor cushion.  ? Sit upright with your back straight. Let your arms fall next to your side with your hands resting on your legs.  ? If sitting in a chair, rest your  feet flat on the floor.  ? If sitting on a cushion, cross your legs in front of you.  · Keep your head in a neutral position with your chin dropped slightly. Relax your jaw and rest the tip of your tongue on the roof of your mouth. Drop your gaze to the floor. You can close your eyes if you like.  · Breathe normally and pay attention to your breath. Feel the air moving in and out of your nose. Feel your belly expanding and relaxing with each breath.  · Your mind may wander, and that is okay. Make note of when your thoughts drift, and return your attention to your breath.  · Avoid placing judgment on yourself, your feelings, or your surroundings. Make note of any judgment or feelings that come up, let them go, and bring your attention back to your breath.  · When you are ready, lift your gaze or open your eyes. Pay attention to how your body feels after the meditation.  Where to find more information  You can find more information about MBSR from:  · Your health care provider.  · Community-based meditation centers or programs.  · Programs offered near you.  Summary  · Mindfulness-based stress reduction (MBSR) is a program that teaches you how to intentionally pay attention to the present moment. It is used with other treatments to help you cope better with daily stress, emotions, and pain.  · MBSR focuses on developing self-awareness, which allows you to respond to life stress without judgment or negative emotions.  · MBSR programs may involve learning different mindfulness practices, such as breathing exercises, meditation, yoga, body scan, or mindful eating. Find a mindfulness practice that works best for you, and set aside time for it on a regular basis.  This information is not intended to replace advice given to you by your health care provider. Make sure you discuss any questions you have with your health care provider.  Document Revised: 02/22/2021 Document Reviewed: 04/26/2018  Elsevier Patient Education ©  2021 [employer].  Bupropion tablets (Depression/Mood Disorders)  What is this medicine?  BUPROPION (byoo PROE pee on) is used to treat depression.  This medicine may be used for other purposes; ask your health care provider or pharmacist if you have questions.  COMMON BRAND NAME(S): Wellbutrin  What should I tell my health care provider before I take this medicine?  They need to know if you have any of these conditions:  · an eating disorder, such as anorexia or bulimia  · bipolar disorder or psychosis  · diabetes or high blood sugar, treated with medication  · glaucoma  · heart disease, previous heart attack, or irregular heart beat  · head injury or brain tumor  · high blood pressure  · kidney or liver disease  · seizures  · suicidal thoughts or a previous suicide attempt  · Tourette's syndrome  · weight loss  · an unusual or allergic reaction to bupropion, other medicines, foods, dyes, or preservatives  · breast-feeding  · pregnant or trying to become pregnant  How should I use this medicine?  Take this medicine by mouth with a glass of water. Follow the directions on the prescription label. You can take it with or without food. If it upsets your stomach, take it with food. Take your medicine at regular intervals. Do not take your medicine more often than directed. Do not stop taking this medicine suddenly except upon the advice of your doctor. Stopping this medicine too quickly may cause serious side effects or your condition may worsen.  A special MedGuide will be given to you by the pharmacist with each prescription and refill. Be sure to read this information carefully each time.  Talk to your pediatrician regarding the use of this medicine in children. Special care may be needed.  Overdosage: If you think you have taken too much of this medicine contact a poison control center or emergency room at once.  NOTE: This medicine is only for you. Do not share this medicine with others.  What if I miss a  dose?  If you miss a dose, take it as soon as you can. If it is less than four hours to your next dose, take only that dose and skip the missed dose. Do not take double or extra doses.  What may interact with this medicine?  Do not take this medicine with any of the following medications:  · linezolid  · MAOIs like Azilect, Carbex, Eldepryl, Marplan, Nardil, and Parnate  · methylene blue (injected into a vein)  · other medicines that contain bupropion like Zyban  This medicine may also interact with the following medications:  · alcohol  · certain medicines for anxiety or sleep  · certain medicines for blood pressure like metoprolol, propranolol  · certain medicines for depression or psychotic disturbances  · certain medicines for HIV or AIDS like efavirenz, lopinavir, nelfinavir, ritonavir  · certain medicines for irregular heart beat like propafenone, flecainide  · certain medicines for Parkinson's disease like amantadine, levodopa  · certain medicines for seizures like carbamazepine, phenytoin, phenobarbital  · cimetidine  · clopidogrel  · cyclophosphamide  · digoxin  · furazolidone  · isoniazid  · nicotine  · orphenadrine  · procarbazine  · steroid medicines like prednisone or cortisone  · stimulant medicines for attention disorders, weight loss, or to stay awake  · tamoxifen  · theophylline  · thiotepa  · ticlopidine  · tramadol  · warfarin  This list may not describe all possible interactions. Give your health care provider a list of all the medicines, herbs, non-prescription drugs, or dietary supplements you use. Also tell them if you smoke, drink alcohol, or use illegal drugs. Some items may interact with your medicine.  What should I watch for while using this medicine?  Tell your doctor if your symptoms do not get better or if they get worse. Visit your doctor or healthcare provider for regular checks on your progress. Because it may take several weeks to see the full effects of this medicine, it is  important to continue your treatment as prescribed by your doctor.  This medicine may cause serious skin reactions. They can happen weeks to months after starting the medicine. Contact your healthcare provider right away if you notice fevers or flu-like symptoms with a rash. The rash may be red or purple and then turn into blisters or peeling of the skin. Or, you might notice a red rash with swelling of the face, lips or lymph nodes in your neck or under your arms.  Patients and their families should watch out for new or worsening thoughts of suicide or depression. Also watch out for sudden changes in feelings such as feeling anxious, agitated, panicky, irritable, hostile, aggressive, impulsive, severely restless, overly excited and hyperactive, or not being able to sleep. If this happens, especially at the beginning of treatment or after a change in dose, call your healthcare provider.  Avoid alcoholic drinks while taking this medicine. Drinking excessive alcoholic beverages, using sleeping or anxiety medicines, or quickly stopping the use of these agents while taking this medicine may increase your risk for a seizure.  Do not drive or use heavy machinery until you know how this medicine affects you. This medicine can impair your ability to perform these tasks.  Do not take this medicine close to bedtime. It may prevent you from sleeping.  Your mouth may get dry. Chewing sugarless gum or sucking hard candy, and drinking plenty of water may help. Contact your doctor if the problem does not go away or is severe.  What side effects may I notice from receiving this medicine?  Side effects that you should report to your doctor or health care professional as soon as possible:  · allergic reactions like skin rash, itching or hives, swelling of the face, lips, or tongue  · breathing problems  · changes in vision  · confusion  · elevated mood, decreased need for sleep, racing thoughts, impulsive behavior  · fast or  irregular heartbeat  · hallucinations, loss of contact with reality  · increased blood pressure  · rash, fever, and swollen lymph nodes  · redness, blistering, peeling, or loosening of the skin, including inside the mouth  · seizures  · suicidal thoughts or other mood changes  · unusually weak or tired  · vomiting  Side effects that usually do not require medical attention (report to your doctor or health care professional if they continue or are bothersome):  · constipation  · headache  · loss of appetite  · nausea  · tremors  · weight loss  This list may not describe all possible side effects. Call your doctor for medical advice about side effects. You may report side effects to FDA at 8-808-DVD-4762.  Where should I keep my medicine?  Keep out of the reach of children.  Store at room temperature between 20 and 25 degrees C (68 and 77 degrees F), away from direct sunlight and moisture. Keep tightly closed. Throw away any unused medicine after the expiration date.  NOTE: This sheet is a summary. It may not cover all possible information. If you have questions about this medicine, talk to your doctor, pharmacist, or health care provider.  © 2021 Elsevier/Gold Standard (2020-03-12 14:02:47)

## 2021-12-28 RX ORDER — ONDANSETRON 4 MG/1
4 TABLET, ORALLY DISINTEGRATING ORAL EVERY 8 HOURS PRN
Qty: 10 TABLET | Refills: 0 | Status: SHIPPED | OUTPATIENT
Start: 2021-12-28 | End: 2022-07-21 | Stop reason: SDUPTHER

## 2022-01-04 ENCOUNTER — LAB (OUTPATIENT)
Dept: LAB | Facility: HOSPITAL | Age: 27
End: 2022-01-04

## 2022-01-04 DIAGNOSIS — J02.9 SORE THROAT: ICD-10-CM

## 2022-01-04 DIAGNOSIS — J02.9 SORE THROAT: Primary | ICD-10-CM

## 2022-01-04 LAB — SARS-COV-2 RNA PNL SPEC NAA+PROBE: NOT DETECTED

## 2022-01-04 PROCEDURE — C9803 HOPD COVID-19 SPEC COLLECT: HCPCS

## 2022-01-04 PROCEDURE — 87635 SARS-COV-2 COVID-19 AMP PRB: CPT

## 2022-01-10 RX ORDER — FLUTICASONE PROPIONATE 50 MCG
2 SPRAY, SUSPENSION (ML) NASAL DAILY
Qty: 16 G | Refills: 11 | Status: SHIPPED | OUTPATIENT
Start: 2022-01-10 | End: 2022-03-17 | Stop reason: SDUPTHER

## 2022-01-11 ENCOUNTER — CLINICAL SUPPORT (OUTPATIENT)
Dept: INTERNAL MEDICINE | Facility: CLINIC | Age: 27
End: 2022-01-11

## 2022-01-11 ENCOUNTER — LAB (OUTPATIENT)
Dept: LAB | Facility: HOSPITAL | Age: 27
End: 2022-01-11

## 2022-01-11 DIAGNOSIS — J06.9 ACUTE URI: ICD-10-CM

## 2022-01-11 DIAGNOSIS — J06.9 ACUTE URI: Primary | ICD-10-CM

## 2022-01-11 LAB — SARS-COV-2 RNA PNL SPEC NAA+PROBE: NOT DETECTED

## 2022-01-11 PROCEDURE — 87635 SARS-COV-2 COVID-19 AMP PRB: CPT

## 2022-01-11 PROCEDURE — C9803 HOPD COVID-19 SPEC COLLECT: HCPCS

## 2022-01-12 ENCOUNTER — TELEMEDICINE (OUTPATIENT)
Dept: INTERNAL MEDICINE | Facility: CLINIC | Age: 27
End: 2022-01-12

## 2022-01-12 DIAGNOSIS — Z20.822 SUSPECTED COVID-19 VIRUS INFECTION: Primary | ICD-10-CM

## 2022-01-12 PROCEDURE — 99213 OFFICE O/P EST LOW 20 MIN: CPT | Performed by: NURSE PRACTITIONER

## 2022-01-12 NOTE — PROGRESS NOTES
Subjective   Adelaida Blair is a 26 y.o. female.   Chief Complaint   Patient presents with   • URI     possible COVID symptoms       Adelaida presents today for mychart visit for complaints of COVID like symptoms.  She states on Monday she started sneezing and had a sore throat.  She was tested for strep and was negative. Her symptoms progressed on Tuesday with severe body aches, sore throat, weakness, and bad headache.  She states she slept until about 1pm.  She has some decreased appetite.  She then developed a some pain in the lower back.  She was unsure if this is a new finding or related to house work she did recently. Yesterday she lost her smell.  COVID-19 test completed yesterday that was negative.  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          The following portions of the patient's history were reviewed and updated as appropriate: allergies, current medications, past family history, past medical history, past social history, past surgical history and problem list.    Review of Systems   Constitutional: Negative for activity change, appetite change, fatigue, fever, unexpected weight gain and unexpected weight loss.   HENT: Positive for congestion, sneezing and sore throat. Negative for swollen glands, trouble swallowing and voice change.    Eyes: Negative for blurred vision and visual disturbance.   Respiratory: Positive for cough. Negative for shortness of breath.    Cardiovascular: Negative for chest pain, palpitations and leg swelling.   Gastrointestinal: Negative for abdominal pain, constipation, diarrhea, nausea, vomiting and indigestion.   Endocrine: Negative for cold intolerance, heat intolerance, polydipsia and polyphagia.   Genitourinary: Negative for dysuria and frequency.   Musculoskeletal: Positive for back pain. Negative for arthralgias, joint swelling and neck pain.   Skin: Negative  for color change, rash and skin lesions.   Neurological: Negative for dizziness, weakness, headache, memory problem and confusion.   Hematological: Does not bruise/bleed easily.   Psychiatric/Behavioral: Negative for agitation, hallucinations and suicidal ideas. The patient is not nervous/anxious.        Objective   Past Medical History:   Diagnosis Date   • GERD (gastroesophageal reflux disease)       Past Surgical History:   Procedure Laterality Date   • HEAD/NECK LESION/CYST EXCISION Left 11/12/2020    Procedure: Excision of lesion of the left jawline with linear closure;  Surgeon: David Aleman MD;  Location: [facility];  Service: ENT;  Laterality: Left;   • KIDNEY SURGERY      Removal   • MYRINGOTOMY W/ TUBES          Current Outpatient Medications:   •  buPROPion XL (Wellbutrin XL) 150 MG 24 hr tablet, Take 1 tablet by mouth Daily., Disp: 30 tablet, Rfl: 0  •  fluticasone (Flonase) 50 MCG/ACT nasal spray, 2 sprays into the nostril(s) as directed by provider Daily., Disp: 16 g, Rfl: 11  •  hyoscyamine (LEVSIN) 0.125 MG SL tablet, Take 1 tablet by mouth Every 4 (Four) Hours As Needed for Cramping., Disp: 30 tablet, Rfl: 0  •  methocarbamol (ROBAXIN) 750 MG tablet, Take 1 tablet by mouth 3 (Three) Times a Day As Needed (cramps)., Disp: 30 tablet, Rfl: 0  •  omeprazole (priLOSEC) 20 MG capsule, Take 40 mg by mouth Daily., Disp: , Rfl:   •  ondansetron ODT (Zofran ODT) 4 MG disintegrating tablet, Place 1 tablet on the tongue Every 8 (Eight) Hours As Needed for Nausea or Vomiting., Disp: 10 tablet, Rfl: 0  •  sertraline (ZOLOFT) 50 MG tablet, Take 50 mg by mouth 2 (Two) Times a Day., Disp: , Rfl:       There were no vitals filed for this visit.  There were no vitals filed for this visit.    There is no height or weight on file to calculate BMI.    Physical Exam  Constitutional:       General: She is not in acute distress.     Appearance: Normal appearance. She is not ill-appearing.   Pulmonary:      Effort:  Pulmonary effort is normal.   Neurological:      Mental Status: She is alert.   Psychiatric:         Mood and Affect: Mood normal.         Behavior: Behavior normal.         Thought Content: Thought content normal.         Judgment: Judgment normal.           Assessment/Plan   Diagnoses and all orders for this visit:    1. Suspected COVID-19 virus infection (Primary)      I have instructed Adelaida to return for repeat COVID-19 testing tomorrow afternoon (day 4 of symptoms).  At this point, needs to quarantine and not go to work. Her symptoms are very suspicious for COVID-19.  I've discussed increasing water intake, Vitamin D, Vitamin C, and zinc for immune support and taking deep breaths and coughing.  Will see how symptoms progress and what testing shows before potential return to work date is given.

## 2022-01-13 ENCOUNTER — CLINICAL SUPPORT (OUTPATIENT)
Dept: INTERNAL MEDICINE | Facility: CLINIC | Age: 27
End: 2022-01-13

## 2022-01-13 DIAGNOSIS — J06.9 ACUTE URI: Primary | ICD-10-CM

## 2022-01-13 DIAGNOSIS — J06.9 ACUTE URI: ICD-10-CM

## 2022-01-13 LAB
EXPIRATION DATE: NORMAL
FLUAV AG NPH QL: NEGATIVE
FLUBV AG NPH QL: NEGATIVE
INTERNAL CONTROL: NORMAL
Lab: NORMAL
SARS-COV-2 RNA PNL SPEC NAA+PROBE: NOT DETECTED

## 2022-01-13 PROCEDURE — 87635 SARS-COV-2 COVID-19 AMP PRB: CPT | Performed by: NURSE PRACTITIONER

## 2022-01-13 PROCEDURE — 87804 INFLUENZA ASSAY W/OPTIC: CPT | Performed by: NURSE PRACTITIONER

## 2022-01-17 ENCOUNTER — TELEMEDICINE (OUTPATIENT)
Dept: INTERNAL MEDICINE | Facility: CLINIC | Age: 27
End: 2022-01-17

## 2022-01-17 DIAGNOSIS — Z20.822 SUSPECTED COVID-19 VIRUS INFECTION: Primary | ICD-10-CM

## 2022-01-17 DIAGNOSIS — J40 BRONCHITIS: ICD-10-CM

## 2022-01-17 DIAGNOSIS — Z20.822 LAB TEST NEGATIVE FOR COVID-19 VIRUS: ICD-10-CM

## 2022-01-17 DIAGNOSIS — R53.83 FATIGUE, UNSPECIFIED TYPE: ICD-10-CM

## 2022-01-17 PROCEDURE — 99212 OFFICE O/P EST SF 10 MIN: CPT | Performed by: NURSE PRACTITIONER

## 2022-01-17 RX ORDER — PREDNISONE 20 MG/1
20 TABLET ORAL 2 TIMES DAILY
Qty: 10 TABLET | Refills: 0 | Status: SHIPPED | OUTPATIENT
Start: 2022-01-17 | End: 2022-01-18 | Stop reason: SDUPTHER

## 2022-01-17 RX ORDER — PREDNISONE 20 MG/1
20 TABLET ORAL 2 TIMES DAILY
Qty: 10 TABLET | Refills: 0 | Status: SHIPPED | OUTPATIENT
Start: 2022-01-17 | End: 2022-01-17

## 2022-01-17 NOTE — PROGRESS NOTES
"Subjective   Adelaida Blair is a 26 y.o. female.   Chief Complaint   Patient presents with   • Cough     covid negative x 2   • URI     persistent cought, chest tightness   • Sinusitis     nasal sinus congestion and pressure   • Fatigue     better, but persistent       You have chosen to receive care through a telehealth visit.  Do you consent to use a video/audio connection for your medical care today? Yes    Ms. Blair is a pleasant 26-year-old female seen started having COVID-like symptoms Tuesday past week.  She had cough and fatigue which progressed to loss of smell, loss of appetite, sleeping 16 to 18 hours, diarrhea, nausea, nasal/sinus/chest congestion.  She reports today persistent dry cough and reports nasal/sinus/chest congestion. She states intermittently feeling short of breath related to the congestion but denies oxygen saturation drops < 90%. Patient reports that \" I am still feeling sick\". She requires an return to work excuse from [employer]. She tested negative for COVID x 2. Flu Negative       The following portions of the patient's history were reviewed and updated as appropriate: allergies, current medications, past family history, past medical history, past social history, past surgical history and problem list.    Review of Systems   Constitutional: Positive for appetite change and fatigue. Negative for activity change, fever, unexpected weight gain and unexpected weight loss.   HENT: Positive for congestion, postnasal drip, rhinorrhea, sinus pressure, sneezing and swollen glands. Negative for trouble swallowing and voice change.         Loss sense of smell.   Eyes: Negative for blurred vision and visual disturbance.   Respiratory: Positive for cough, chest tightness and shortness of breath.    Cardiovascular: Negative for chest pain, palpitations and leg swelling.   Gastrointestinal: Negative for abdominal pain, constipation, diarrhea, nausea, vomiting and indigestion.   Endocrine: Negative for " cold intolerance, heat intolerance, polydipsia and polyphagia.   Genitourinary: Negative for dysuria and frequency.   Musculoskeletal: Negative for arthralgias, back pain, joint swelling and neck pain.   Skin: Negative for color change, rash and skin lesions.   Neurological: Negative for dizziness, weakness, headache, memory problem and confusion.   Hematological: Does not bruise/bleed easily.   Psychiatric/Behavioral: Negative for agitation, hallucinations and suicidal ideas. The patient is not nervous/anxious.        Objective   Past Medical History:   Diagnosis Date   • GERD (gastroesophageal reflux disease)       Past Surgical History:   Procedure Laterality Date   • HEAD/NECK LESION/CYST EXCISION Left 11/12/2020    Procedure: Excision of lesion of the left jawline with linear closure;  Surgeon: David Aleman MD;  Location: [facility];  Service: ENT;  Laterality: Left;   • KIDNEY SURGERY      Removal   • MYRINGOTOMY W/ TUBES          Current Outpatient Medications:   •  buPROPion XL (Wellbutrin XL) 150 MG 24 hr tablet, Take 1 tablet by mouth Daily., Disp: 30 tablet, Rfl: 0  •  fluticasone (Flonase) 50 MCG/ACT nasal spray, 2 sprays into the nostril(s) as directed by provider Daily., Disp: 16 g, Rfl: 11  •  omeprazole (priLOSEC) 20 MG capsule, Take 40 mg by mouth Daily., Disp: , Rfl:   •  hyoscyamine (LEVSIN) 0.125 MG SL tablet, Take 1 tablet by mouth Every 4 (Four) Hours As Needed for Cramping., Disp: 30 tablet, Rfl: 0  •  methocarbamol (ROBAXIN) 750 MG tablet, Take 1 tablet by mouth 3 (Three) Times a Day As Needed (cramps)., Disp: 30 tablet, Rfl: 0  •  ondansetron ODT (Zofran ODT) 4 MG disintegrating tablet, Place 1 tablet on the tongue Every 8 (Eight) Hours As Needed for Nausea or Vomiting., Disp: 10 tablet, Rfl: 0  •  predniSONE (DELTASONE) 20 MG tablet, Take 1 tablet by mouth 2 (Two) Times a Day., Disp: 10 tablet, Rfl: 0      There were no vitals filed for this visit.  There were no vitals filed for this  visit.    There is no height or weight on file to calculate BMI.    Physical Exam  Constitutional:       Appearance: She is well-developed.   HENT:      Head: Normocephalic and atraumatic.      Nose: Congestion and rhinorrhea present.      Mouth/Throat:      Mouth: Mucous membranes are moist.      Pharynx: Oropharynx is clear.   Eyes:      Pupils: Pupils are equal, round, and reactive to light.   Pulmonary:      Effort: Pulmonary effort is normal.   Musculoskeletal:         General: Normal range of motion.      Cervical back: Normal range of motion.   Skin:     General: Skin is dry.      Coloration: Skin is pale.   Neurological:      Mental Status: She is alert and oriented to person, place, and time.   Psychiatric:         Mood and Affect: Mood normal.         Behavior: Behavior normal.               Assessment/Plan   Diagnoses and all orders for this visit:    1. Suspected COVID-19 virus infection (Primary)  -     Discontinue: predniSONE (DELTASONE) 20 MG tablet; Take 1 tablet by mouth 2 (Two) Times a Day.  Dispense: 10 tablet; Refill: 0  -     predniSONE (DELTASONE) 20 MG tablet; Take 1 tablet by mouth 2 (Two) Times a Day.  Dispense: 10 tablet; Refill: 0    2. Fatigue, unspecified type    3. Lab test negative for COVID-19 virus  Comments:  x2    4. Bronchitis  -     Discontinue: predniSONE (DELTASONE) 20 MG tablet; Take 1 tablet by mouth 2 (Two) Times a Day.  Dispense: 10 tablet; Refill: 0  -     predniSONE (DELTASONE) 20 MG tablet; Take 1 tablet by mouth 2 (Two) Times a Day.  Dispense: 10 tablet; Refill: 0      Regardless of negative tests x2 for COVID patient is still mimicking symptoms of COVID illness and is very symptomatic even today.  We will send her some prednisone to help with possible secondary bronchitis.  Patient is not running a fever and denies productive cough however if this does occur we will send her a Z-Laci.    Advised patient to continue regular breast, increase hydration 60-80 ounces fluids.   Would like for her to return to work as long as she is asymptomatic on Thursday.  This will be day 10 and less likely to be spreading COVID virus.    This visit has been scheduled as a video visit to comply with patient safety concerns in accordance with CDC recommendations. Total time of discussion was 13 minutes.

## 2022-01-18 ENCOUNTER — LAB (OUTPATIENT)
Dept: LAB | Facility: HOSPITAL | Age: 27
End: 2022-01-18

## 2022-01-18 ENCOUNTER — CLINICAL SUPPORT (OUTPATIENT)
Dept: INTERNAL MEDICINE | Facility: CLINIC | Age: 27
End: 2022-01-18

## 2022-01-18 DIAGNOSIS — Z20.822 SUSPECTED COVID-19 VIRUS INFECTION: ICD-10-CM

## 2022-01-18 DIAGNOSIS — J40 BRONCHITIS: ICD-10-CM

## 2022-01-18 DIAGNOSIS — Z20.822 SUSPECTED COVID-19 VIRUS INFECTION: Primary | ICD-10-CM

## 2022-01-18 DIAGNOSIS — J02.9 SORE THROAT: Primary | ICD-10-CM

## 2022-01-18 LAB
EXPIRATION DATE: NORMAL
INTERNAL CONTROL: NORMAL
Lab: NORMAL
S PYO AG THROAT QL: NEGATIVE
SARS-COV-2 RNA PNL SPEC NAA+PROBE: DETECTED

## 2022-01-18 PROCEDURE — C9803 HOPD COVID-19 SPEC COLLECT: HCPCS | Performed by: NURSE PRACTITIONER

## 2022-01-18 PROCEDURE — 87635 SARS-COV-2 COVID-19 AMP PRB: CPT | Performed by: NURSE PRACTITIONER

## 2022-01-18 PROCEDURE — 87880 STREP A ASSAY W/OPTIC: CPT | Performed by: NURSE PRACTITIONER

## 2022-01-18 RX ORDER — PREDNISONE 20 MG/1
20 TABLET ORAL 2 TIMES DAILY
Qty: 10 TABLET | Refills: 0 | OUTPATIENT
Start: 2022-01-18 | End: 2022-01-21

## 2022-01-18 NOTE — PROGRESS NOTES
Adelaida Blair  1995  [id number]    Verified patient's NAME and  before test was performed.       COVID-19 Test Performed:   Nasopharyngeal Swab     Additional Tests Performed:   POCT STREP     Location:  PATIENT VEHICLE - [facility]    How did the patient tolerate the test?   Well tolerated by patient..    Staff Member Performing Test:  Susana Jolly MA    PPE Worn:    mask, gloves, eye protection, face shield and gown          Patient presented for COVID-19 testing as ordered by the provider. Appropriate PPE donned. Patient tolerated well. Patient was given COVID-19 Fact Sheet, How to Quarantine at Home Instructions, and Infection Prevention in the Home handout. Patient advised that results are expected within 2-4 days depending on the time of test.     While waiting for results of test, patient was asked to please call their primary care physician's office or the [place] hotline at [phone] for support of non-emergent symptoms expected with this virus such as increased shortness of breath, fever, cough, or other questions.    He was advised to seek care in the Emergency Department should extreme symptoms arise such as new onset confusion, extreme lethargy, hypoxia, or new hypotension.     Questions were engaged and answered to the best of my ability, patient expressed verbal understanding.

## 2022-01-20 NOTE — ANESTHESIA PREPROCEDURE EVALUATION
Anesthesia Evaluation     Patient summary reviewed and Nursing notes reviewed   no history of anesthetic complications:  NPO Solid Status: > 8 hours  NPO Liquid Status: > 8 hours           Airway   Mallampati: I  TM distance: >3 FB  Neck ROM: full  No difficulty expected  Dental      Pulmonary    (-) asthma, not a smoker  Cardiovascular   Exercise tolerance: good (4-7 METS)    (-) hypertension, CAD      Neuro/Psych  (+) psychiatric history Anxiety,     (-) seizures, TIA, CVA  GI/Hepatic/Renal/Endo    (+)  GERD,    (-) liver disease, no renal disease, diabetes    Musculoskeletal     Abdominal    Substance History      OB/GYN          Other                        Anesthesia Plan    ASA 2     general     intravenous induction     Anesthetic plan, all risks, benefits, and alternatives have been provided, discussed and informed consent has been obtained with: patient.       Monitor.

## 2022-01-21 ENCOUNTER — HOSPITAL ENCOUNTER (EMERGENCY)
Facility: HOSPITAL | Age: 27
Discharge: HOME | End: 2022-01-21
Admitting: STUDENT IN AN ORGANIZED HEALTH CARE EDUCATION/TRAINING PROGRAM

## 2022-01-21 ENCOUNTER — APPOINTMENT (OUTPATIENT)
Dept: GENERAL RADIOLOGY | Facility: HOSPITAL | Age: 27
End: 2022-01-21

## 2022-01-21 VITALS
TEMPERATURE: 98 F | WEIGHT: 162 LBS | RESPIRATION RATE: 20 BRPM | SYSTOLIC BLOOD PRESSURE: 122 MMHG | BODY MASS INDEX: 28.7 KG/M2 | DIASTOLIC BLOOD PRESSURE: 78 MMHG | OXYGEN SATURATION: 99 % | HEIGHT: 63 IN | HEART RATE: 66 BPM

## 2022-01-21 DIAGNOSIS — U07.1 COVID-19: Primary | ICD-10-CM

## 2022-01-21 LAB
ALBUMIN SERPL-MCNC: 4.4 G/DL (ref 3.5–5.2)
ALBUMIN/GLOB SERPL: 1.4 G/DL
ALP SERPL-CCNC: 57 U/L (ref 39–117)
ALT SERPL W P-5'-P-CCNC: 13 U/L (ref 1–33)
ANION GAP SERPL CALCULATED.3IONS-SCNC: 12 MMOL/L (ref 5–15)
APAP SERPL-MCNC: <5 MCG/ML (ref 0–30)
AST SERPL-CCNC: 12 U/L (ref 1–32)
BASOPHILS # BLD AUTO: 0.02 10*3/MM3 (ref 0–0.2)
BASOPHILS NFR BLD AUTO: 0.2 % (ref 0–1.5)
BILIRUB SERPL-MCNC: 0.2 MG/DL (ref 0–1.2)
BUN SERPL-MCNC: 15 MG/DL (ref 6–20)
BUN/CREAT SERPL: 22.1 (ref 7–25)
CALCIUM SPEC-SCNC: 9.5 MG/DL (ref 8.6–10.5)
CHLORIDE SERPL-SCNC: 103 MMOL/L (ref 98–107)
CO2 SERPL-SCNC: 27 MMOL/L (ref 22–29)
CREAT SERPL-MCNC: 0.68 MG/DL (ref 0.57–1)
DEPRECATED RDW RBC AUTO: 41.1 FL (ref 37–54)
EOSINOPHIL # BLD AUTO: 0 10*3/MM3 (ref 0–0.4)
EOSINOPHIL NFR BLD AUTO: 0 % (ref 0.3–6.2)
ERYTHROCYTE [DISTWIDTH] IN BLOOD BY AUTOMATED COUNT: 14.1 % (ref 12.3–15.4)
GFR SERPL CREATININE-BSD FRML MDRD: 104 ML/MIN/1.73
GFR SERPL CREATININE-BSD FRML MDRD: 126 ML/MIN/1.73
GLOBULIN UR ELPH-MCNC: 3.2 GM/DL
GLUCOSE SERPL-MCNC: 133 MG/DL (ref 65–99)
HCG SERPL QL: NEGATIVE
HCT VFR BLD AUTO: 40.8 % (ref 34–46.6)
HGB BLD-MCNC: 13 G/DL (ref 12–15.9)
HOLD SPECIMEN: NORMAL
IMM GRANULOCYTES # BLD AUTO: 0.11 10*3/MM3 (ref 0–0.05)
IMM GRANULOCYTES NFR BLD AUTO: 0.9 % (ref 0–0.5)
LYMPHOCYTES # BLD AUTO: 1.15 10*3/MM3 (ref 0.7–3.1)
LYMPHOCYTES NFR BLD AUTO: 9.6 % (ref 19.6–45.3)
MCH RBC QN AUTO: 26.1 PG (ref 26.6–33)
MCHC RBC AUTO-ENTMCNC: 31.9 G/DL (ref 31.5–35.7)
MCV RBC AUTO: 81.8 FL (ref 79–97)
MONOCYTES # BLD AUTO: 0.13 10*3/MM3 (ref 0.1–0.9)
MONOCYTES NFR BLD AUTO: 1.1 % (ref 5–12)
NEUTROPHILS NFR BLD AUTO: 10.57 10*3/MM3 (ref 1.7–7)
NEUTROPHILS NFR BLD AUTO: 88.2 % (ref 42.7–76)
NRBC BLD AUTO-RTO: 0 /100 WBC (ref 0–0.2)
PLATELET # BLD AUTO: 342 10*3/MM3 (ref 140–450)
PMV BLD AUTO: 9.4 FL (ref 6–12)
POTASSIUM SERPL-SCNC: 3.9 MMOL/L (ref 3.5–5.2)
PROT SERPL-MCNC: 7.6 G/DL (ref 6–8.5)
RBC # BLD AUTO: 4.99 10*6/MM3 (ref 3.77–5.28)
SODIUM SERPL-SCNC: 142 MMOL/L (ref 136–145)
WBC NRBC COR # BLD: 11.98 10*3/MM3 (ref 3.4–10.8)
WHOLE BLOOD HOLD SPECIMEN: NORMAL
WHOLE BLOOD HOLD SPECIMEN: NORMAL

## 2022-01-21 PROCEDURE — 99283 EMERGENCY DEPT VISIT LOW MDM: CPT

## 2022-01-21 PROCEDURE — 36415 COLL VENOUS BLD VENIPUNCTURE: CPT

## 2022-01-21 PROCEDURE — 80053 COMPREHEN METABOLIC PANEL: CPT | Performed by: PHYSICIAN ASSISTANT

## 2022-01-21 PROCEDURE — 84703 CHORIONIC GONADOTROPIN ASSAY: CPT | Performed by: PHYSICIAN ASSISTANT

## 2022-01-21 PROCEDURE — 80143 DRUG ASSAY ACETAMINOPHEN: CPT | Performed by: PHYSICIAN ASSISTANT

## 2022-01-21 PROCEDURE — 71045 X-RAY EXAM CHEST 1 VIEW: CPT

## 2022-01-21 PROCEDURE — 85025 COMPLETE CBC W/AUTO DIFF WBC: CPT | Performed by: PHYSICIAN ASSISTANT

## 2022-01-21 RX ADMIN — SODIUM CHLORIDE, POTASSIUM CHLORIDE, SODIUM LACTATE AND CALCIUM CHLORIDE 1000 ML: 600; 310; 30; 20 INJECTION, SOLUTION INTRAVENOUS at 18:16

## 2022-01-21 NOTE — ED PROVIDER NOTES
Subjective   History of Present Illness    Patient is a pleasant 26-year-old female chief complaint of not feeling well with Covid-19.  The patient describes that she became symptomatic about 10 days ago.  She had 2 negative tests that same week but had a positive COVID-19 test about 2 to 3 days ago.  Her symptoms persisted where she has headache, body aches, cough, and pain with breathing.  Yesterday, she began to take a gram of Tylenol and 600 mg of ibuprofen every hour to alleviate her discomfort.  She believes she took no more than 4 doses.  Her last dose today was about 4 hours ago.  She describes employee health contacted her and she relayed this information.  With this information, she was advised to go to the ER immediately for further evaluation.    Patient denies being pregnant.  She denies any history of asthma or pneumonia.  She reports she is otherwise healthy.    Review of Systems   Constitutional: Positive for activity change and fatigue.   HENT: Positive for congestion.    Respiratory: Positive for cough.    Cardiovascular: Negative.    Gastrointestinal: Negative.    Genitourinary: Negative.    Musculoskeletal: Positive for arthralgias and myalgias.   Skin: Negative.    Neurological: Positive for headaches.   Psychiatric/Behavioral: Negative.    All other systems reviewed and are negative.      Past Medical History:   Diagnosis Date   • GERD (gastroesophageal reflux disease)        No Known Allergies    Past Surgical History:   Procedure Laterality Date   • HEAD/NECK LESION/CYST EXCISION Left 11/12/2020    Procedure: Excision of lesion of the left jawline with linear closure;  Surgeon: David Aleman MD;  Location: [facility];  Service: ENT;  Laterality: Left;   • KIDNEY SURGERY      Removal   • MYRINGOTOMY W/ TUBES         Family History   Problem Relation Age of Onset   • Diabetes Father    • Hyperlipidemia Father        Social History     Socioeconomic History   • Marital status:   "  Tobacco Use   • Smoking status: Never Smoker   • Smokeless tobacco: Never Used   Vaping Use   • Vaping Use: Never used   Substance and Sexual Activity   • Alcohol use: Never   • Drug use: Never   • Sexual activity: Defer       Prior to Admission medications    Medication Sig Start Date End Date Taking? Authorizing Provider   buPROPion XL (Wellbutrin XL) 150 MG 24 hr tablet Take 1 tablet by mouth Daily. 12/21/21   Lucita Reyes APRN   fluticasone (Flonase) 50 MCG/ACT nasal spray 2 sprays into the nostril(s) as directed by provider Daily. 1/10/22   Lucita Reyes APRN   hyoscyamine (LEVSIN) 0.125 MG SL tablet Take 1 tablet by mouth Every 4 (Four) Hours As Needed for Cramping. 6/11/21   Raven Srivastava APRN   methocarbamol (ROBAXIN) 750 MG tablet Take 1 tablet by mouth 3 (Three) Times a Day As Needed (cramps). 6/12/21   Raven Srivastava APRN   omeprazole (priLOSEC) 20 MG capsule Take 40 mg by mouth Daily. 9/10/20   Provider, MD Barbie   ondansetron ODT (Zofran ODT) 4 MG disintegrating tablet Place 1 tablet on the tongue Every 8 (Eight) Hours As Needed for Nausea or Vomiting. 12/28/21   Francheska Montana APRN   predniSONE (DELTASONE) 20 MG tablet Take 1 tablet by mouth 2 (Two) Times a Day. 1/18/22   Lucita Reyes APRN       Medications   lactated ringers bolus 1,000 mL (1,000 mL Intravenous New Bag 1/21/22 1816)       /86   Pulse 74   Temp 98.3 °F (36.8 °C) (Oral)   Resp 20   Ht 160 cm (63\")   Wt 73.5 kg (162 lb)   LMP 01/14/2022   SpO2 100%   BMI 28.70 kg/m²       Objective   Physical Exam  Vitals and nursing note reviewed.   Constitutional:       General: She is not in acute distress.     Appearance: She is well-developed. She is not diaphoretic.   HENT:      Head: Normocephalic and atraumatic.   Eyes:      Conjunctiva/sclera: Conjunctivae normal.      Pupils: Pupils are equal, round, and reactive to light.   Neck:      Trachea: No " tracheal deviation.   Cardiovascular:      Rate and Rhythm: Normal rate and regular rhythm.      Heart sounds: Normal heart sounds. No murmur heard.      Pulmonary:      Effort: Pulmonary effort is normal. No respiratory distress.      Breath sounds: Normal breath sounds. No stridor. No wheezing, rhonchi or rales.   Chest:      Chest wall: No tenderness.   Abdominal:      General: Bowel sounds are normal. There is no distension.      Palpations: Abdomen is soft. There is no mass.      Tenderness: There is no abdominal tenderness. There is no guarding or rebound.   Musculoskeletal:         General: Normal range of motion.      Cervical back: Normal range of motion and neck supple.   Skin:     General: Skin is warm and dry.      Capillary Refill: Capillary refill takes less than 2 seconds.   Neurological:      General: No focal deficit present.      Mental Status: She is alert and oriented to person, place, and time.   Psychiatric:         Mood and Affect: Mood normal.         Behavior: Behavior normal.         Thought Content: Thought content normal.         Judgment: Judgment normal.         Procedures         Lab Results (last 24 hours)     Procedure Component Value Units Date/Time    [place] Urine - Urine, Clean Catch [[id number]] Collected: 01/21/22 1706    Specimen: Urine, Clean Catch Updated: 01/21/22 1815     Extra Tube Hold for add-ons.     Comment: Auto resulted.       CBC & Differential [[id number]]  (Abnormal) Collected: 01/21/22 1706    Specimen: Blood Updated: 01/21/22 9740    Narrative:      The following orders were created for panel order CBC & Differential.  Procedure                               Abnormality         Status                     ---------                               -----------         ------                     CBC Auto Differential[[id number]]        Abnormal            Final result                 Please view results for these tests on the individual orders.    Comprehensive  Metabolic Panel [[id number]]  (Abnormal) Collected: 01/21/22 1706    Specimen: Blood Updated: 01/21/22 1831     Glucose 133 mg/dL      BUN 15 mg/dL      Creatinine 0.68 mg/dL      Sodium 142 mmol/L      Potassium 3.9 mmol/L      Chloride 103 mmol/L      CO2 27.0 mmol/L      Calcium 9.5 mg/dL      Total Protein 7.6 g/dL      Albumin 4.40 g/dL      ALT (SGPT) 13 U/L      AST (SGOT) 12 U/L      Alkaline Phosphatase 57 U/L      Total Bilirubin 0.2 mg/dL      eGFR Non African Amer 104 mL/min/1.73      eGFR  African Amer 126 mL/min/1.73      Globulin 3.2 gm/dL      A/G Ratio 1.4 g/dL      BUN/Creatinine Ratio 22.1     Anion Gap 12.0 mmol/L     Narrative:      GFR Normal >60  Chronic Kidney Disease <60  Kidney Failure <15      Acetaminophen Level [[id number]]  (Normal) Collected: 01/21/22 1706    Specimen: Blood Updated: 01/21/22 1828     Acetaminophen <5.0 mcg/mL     hCG, Serum, Qualitative [[id number]]  (Normal) Collected: 01/21/22 1706    Specimen: Blood Updated: 01/21/22 1816     HCG Qualitative Negative    CBC Auto Differential [[id number]]  (Abnormal) Collected: 01/21/22 1706    Specimen: Blood Updated: 01/21/22 1753     WBC 11.98 10*3/mm3      RBC 4.99 10*6/mm3      Hemoglobin 13.0 g/dL      Hematocrit 40.8 %      MCV 81.8 fL      MCH 26.1 pg      MCHC 31.9 g/dL      RDW 14.1 %      RDW-SD 41.1 fl      MPV 9.4 fL      Platelets 342 10*3/mm3      Neutrophil % 88.2 %      Lymphocyte % 9.6 %      Monocyte % 1.1 %      Eosinophil % 0.0 %      Basophil % 0.2 %      Immature Grans % 0.9 %      Neutrophils, Absolute 10.57 10*3/mm3      Lymphocytes, Absolute 1.15 10*3/mm3      Monocytes, Absolute 0.13 10*3/mm3      Eosinophils, Absolute 0.00 10*3/mm3      Basophils, Absolute 0.02 10*3/mm3      Immature Grans, Absolute 0.11 10*3/mm3      nRBC 0.0 /100 WBC           XR Chest 1 View    Result Date: 1/21/2022  Narrative: Frontal upright radiograph of the chest 1/21/2022 7:51 PM CST  HISTORY: Covid, cough  COMPARISON: None.   FINDINGS:  No lung consolidation. No pleural effusion or pneumothorax. The cardiomediastinal silhouette and pulmonary vascularity are within normal limits. The osseous structures and surrounding soft tissues demonstrate no acute abnormality.      Impression: 1. No radiographic evidence of acute cardiopulmonary process. No visualized pulmonary infiltrate at this time.   This report was finalized on 01/21/2022 20:21 by Dr Abdirashid Dennison, .      ED Course  ED Course as of 01/21/22 2028 Fri Jan 21, 2022 2026 Patient has been educated importance of taking over-the-counter medication all medications as directed.  She understands the risk of Tylenol overdose as well as liver and GI issues for not taking Tylenol ibuprofen as directed.  Thankfully, there is no evidence of acetaminophen toxicity.Chest x-ray did not show any evidence of pneumonia.  Patient is hemodynamically stable and not hypoxic.  She will be discharged stable condition.  She is aware that she does not qualify for monoclonal antibody infusion and she is at the window for any antiviral oral pills. [TK]      ED Course User Index  [TK] Salty Lopez PA          [facility]    Final diagnoses:   COVID-19          Salty Lopez PA  01/21/22 2028

## 2022-01-22 ENCOUNTER — READMISSION MANAGEMENT (OUTPATIENT)
Dept: CALL CENTER | Facility: HOSPITAL | Age: 27
End: 2022-01-22

## 2022-01-22 NOTE — OUTREACH NOTE
COVID-19 Week 1 Survey      Responses   [facility] patient discharged from? [place]   Does the patient have one of the following disease processes/diagnoses(primary or secondary)? COVID-19   COVID-19 underlying condition? None   Call Number Call 1   Week 1 Call successful? Yes   Call start time 1059   Call end time 1101   General alerts for this patient ED visit-home with pulse oximeter   Discharge diagnosis COVID-19   Is patient permission given to speak with other caregiver? No   Meds reviewed with patient/caregiver? Yes   Is the patient having any side effects they believe may be caused by any medication additions or changes? No   Does the patient have all medications ordered at discharge? Yes   Is the patient taking all medications as directed (includes completed medication regime)? Yes   Does the patient have a primary care provider?  Yes   Does the patient have an appointment with their PCP or specialist within 7 days of discharge? Yes   Has the patient kept scheduled appointments due by today? Yes   Has home health visited the patient within 72 hours of discharge? N/A   What DME was ordered? home with pulse oximeter   Psychosocial issues? No   Did the patient receive a copy of their discharge instructions? Yes   Did the patient receive a copy of COVID-19 specific instructions? Yes   Nursing interventions Reviewed instructions with patient   What is the patient's perception of their health status since discharge? Improving   Nursing Interventions Nurse provided patient education   Pulse Ox monitoring None   Is the patient/caregiver able to teach back steps to recovery at home? Set small, achievable goals for return to baseline health,  Rest and rebuild strength, gradually increase activity,  Practice good oral hygiene,  Make a list of questions for provider's appointment   If the patient is a current smoker, are they able to teach back resources for cessation? Not a smoker   Is the patient/caregiver able  to teach back the hierarchy of who to call/visit for symptoms/problems? PCP, Specialist, Home health nurse, Urgent Care, ED, 911 Yes   COVID-19 call completed? Yes   Wrap up additional comments She is doing well. No problems.           Karin Guillen RN

## 2022-01-22 NOTE — OUTREACH NOTE
Prep Survey      Responses   [facility] [place]   Is LACE score < 7 ? No   Emergency Room discharge w/ pulse ox? Yes   Eligibility Readm Mgmt   Discharge diagnosis COVID-19   Does the patient have one of the following disease processes/diagnoses(primary or secondary)? COVID-19   Does the patient have Home health ordered? No   Is there a DME ordered? Yes   What DME was ordered? home with pulse oximeter   General alerts for this patient ED visit-home with pulse oximeter   Prep survey completed? Yes          Savita Colmenares RN

## 2022-01-23 ENCOUNTER — READMISSION MANAGEMENT (OUTPATIENT)
Dept: CALL CENTER | Facility: HOSPITAL | Age: 27
End: 2022-01-23

## 2022-01-23 NOTE — OUTREACH NOTE
COVID-19 Week 1 Survey      Responses   [facility] patient discharged from? [place]   Does the patient have one of the following disease processes/diagnoses(primary or secondary)? COVID-19   COVID-19 underlying condition? None   Call Number Call 2   Week 1 Call successful? No  [contact number answered by recording, no message left]   Discharge diagnosis COVID-19          Earlene Falcon RN

## 2022-01-24 ENCOUNTER — READMISSION MANAGEMENT (OUTPATIENT)
Dept: CALL CENTER | Facility: HOSPITAL | Age: 27
End: 2022-01-24

## 2022-01-24 NOTE — OUTREACH NOTE
"COVID-19 Week 1 Survey      Responses   [facility] patient discharged from? [place]   Does the patient have one of the following disease processes/diagnoses(primary or secondary)? COVID-19   COVID-19 underlying condition? None   Call Number Call 3   Week 1 Call successful? Yes   Call start time 1210   Call end time 1211   General alerts for this patient ED visit-home with pulse oximeter   Discharge diagnosis COVID-19   Meds reviewed with patient/caregiver? Yes   Is the patient taking all medications as directed (includes completed medication regime)? Yes   Comments regarding PCP Pt states, \"I work here so I'll schedule it\"   Does the patient have an appointment with their PCP or specialist within 7 days of discharge? No   What is preventing the patient from scheduling follow up appointments within 7 days of discharge? Haven't had time   Nursing Interventions Advised patient to make appointment   Has the patient kept scheduled appointments due by today? N/A   What is the patient's perception of their health status since discharge? Improving   Does the patient have any of the following symptoms? Cough  [congestion]   Pulse Ox monitoring None   Is the patient/caregiver able to teach back steps to recovery at home? Rest and rebuild strength, gradually increase activity,  Eat a well-balance diet   COVID-19 call completed? Yes   Revoked No further contact(revokes)-requires comment   Is the patient interested in additional calls from an ambulatory ?  NOTE:  applies to high risk patients requiring additional follow-up. No   Graduated/Revoked comments Patient has returned to work. She works at [employer] office.           Katie Gan RN  "

## 2022-01-28 RX ORDER — AMOXICILLIN AND CLAVULANATE POTASSIUM 875; 125 MG/1; MG/1
1 TABLET, FILM COATED ORAL 2 TIMES DAILY
Qty: 20 TABLET | Refills: 0 | Status: SHIPPED | OUTPATIENT
Start: 2022-01-28 | End: 2022-02-07

## 2022-02-16 NOTE — TELEPHONE ENCOUNTER
Patient called to report sinus post nasal drainage has caused a sore throat. Examination revealed strep throat. Medication to be sent to pharmacy.    none

## 2022-03-08 ENCOUNTER — CLINICAL SUPPORT (OUTPATIENT)
Dept: INTERNAL MEDICINE | Facility: CLINIC | Age: 27
End: 2022-03-08

## 2022-03-08 DIAGNOSIS — R39.9 UTI SYMPTOMS: Primary | ICD-10-CM

## 2022-03-08 DIAGNOSIS — N30.01 ACUTE CYSTITIS WITH HEMATURIA: Primary | ICD-10-CM

## 2022-03-08 LAB
BILIRUB BLD-MCNC: NEGATIVE MG/DL
CLARITY, POC: CLEAR
COLOR UR: YELLOW
EXPIRATION DATE: ABNORMAL
GLUCOSE UR STRIP-MCNC: NEGATIVE MG/DL
KETONES UR QL: NEGATIVE
LEUKOCYTE EST, POC: ABNORMAL
Lab: ABNORMAL
NITRITE UR-MCNC: NEGATIVE MG/ML
PH UR: 7 [PH] (ref 5–8)
PROT UR STRIP-MCNC: NEGATIVE MG/DL
RBC # UR STRIP: ABNORMAL /UL
SP GR UR: 1.03 (ref 1–1.03)
UROBILINOGEN UR QL: NORMAL

## 2022-03-08 PROCEDURE — 99211 OFF/OP EST MAY X REQ PHY/QHP: CPT | Performed by: NURSE PRACTITIONER

## 2022-03-08 PROCEDURE — 81003 URINALYSIS AUTO W/O SCOPE: CPT | Performed by: NURSE PRACTITIONER

## 2022-03-08 RX ORDER — SULFAMETHOXAZOLE AND TRIMETHOPRIM 800; 160 MG/1; MG/1
1 TABLET ORAL 2 TIMES DAILY
Qty: 20 TABLET | Refills: 0 | Status: SHIPPED | OUTPATIENT
Start: 2022-03-08 | End: 2022-03-18

## 2022-03-10 LAB
BACTERIA UR CULT: NORMAL
BACTERIA UR CULT: NORMAL

## 2022-03-14 RX ORDER — CETIRIZINE HYDROCHLORIDE 10 MG/1
10 TABLET ORAL DAILY
Qty: 30 TABLET | Refills: 2 | Status: SHIPPED | OUTPATIENT
Start: 2022-03-14 | End: 2022-09-22 | Stop reason: SDUPTHER

## 2022-03-14 NOTE — TELEPHONE ENCOUNTER
Patient complains of seasonal allergies. [facility]?

## 2022-03-17 RX ORDER — FLUTICASONE PROPIONATE 50 MCG
2 SPRAY, SUSPENSION (ML) NASAL DAILY
Qty: 16 G | Refills: 11 | Status: SHIPPED | OUTPATIENT
Start: 2022-03-17 | End: 2022-12-09

## 2022-03-21 ENCOUNTER — TELEPHONE (OUTPATIENT)
Dept: INTERNAL MEDICINE | Facility: CLINIC | Age: 27
End: 2022-03-21

## 2022-03-21 RX ORDER — AZELASTINE 1 MG/ML
2 SPRAY, METERED NASAL 2 TIMES DAILY
Qty: 30 ML | Refills: 12 | Status: SHIPPED | OUTPATIENT
Start: 2022-03-21 | End: 2022-06-06

## 2022-03-21 RX ORDER — MONTELUKAST SODIUM 10 MG/1
10 TABLET ORAL NIGHTLY
Qty: 30 TABLET | Refills: 3 | Status: SHIPPED | OUTPATIENT
Start: 2022-03-21 | End: 2022-11-16 | Stop reason: SDUPTHER

## 2022-03-21 NOTE — TELEPHONE ENCOUNTER
Patient requesting Singular and Astelin nasal spray.     Patient complains of sinus drainage.     Please send to [facility].

## 2022-03-29 DIAGNOSIS — R14.0 ABDOMINAL BLOATING: Primary | ICD-10-CM

## 2022-03-29 DIAGNOSIS — R53.83 FATIGUE, UNSPECIFIED TYPE: ICD-10-CM

## 2022-03-30 LAB
ALBUMIN SERPL-MCNC: 4.3 G/DL (ref 3.5–5.2)
ALBUMIN/GLOB SERPL: 1.9 G/DL
ALP SERPL-CCNC: 58 U/L (ref 39–117)
ALT SERPL-CCNC: 15 U/L (ref 1–33)
AST SERPL-CCNC: 15 U/L (ref 1–32)
BASOPHILS # BLD AUTO: 0.03 10*3/MM3 (ref 0–0.2)
BASOPHILS NFR BLD AUTO: 0.4 % (ref 0–1.5)
BILIRUB SERPL-MCNC: 0.2 MG/DL (ref 0–1.2)
BUN SERPL-MCNC: 8 MG/DL (ref 6–20)
BUN/CREAT SERPL: 14.3 (ref 7–25)
CALCIUM SERPL-MCNC: 9.4 MG/DL (ref 8.6–10.5)
CHLORIDE SERPL-SCNC: 101 MMOL/L (ref 98–107)
CO2 SERPL-SCNC: 27.2 MMOL/L (ref 22–29)
CREAT SERPL-MCNC: 0.56 MG/DL (ref 0.57–1)
EGFRCR SERPLBLD CKD-EPI 2021: 128.5 ML/MIN/1.73
ENDOMYSIUM IGA SER QL: NEGATIVE
EOSINOPHIL # BLD AUTO: 0.18 10*3/MM3 (ref 0–0.4)
EOSINOPHIL NFR BLD AUTO: 2.3 % (ref 0.3–6.2)
ERYTHROCYTE [DISTWIDTH] IN BLOOD BY AUTOMATED COUNT: 14.2 % (ref 12.3–15.4)
GLIADIN PEPTIDE IGA SER-ACNC: 4 UNITS (ref 0–19)
GLIADIN PEPTIDE IGG SER-ACNC: 3 UNITS (ref 0–19)
GLOBULIN SER CALC-MCNC: 2.3 GM/DL
GLUCOSE SERPL-MCNC: 89 MG/DL (ref 65–99)
HBA1C MFR BLD: 5.6 % (ref 4.8–5.6)
HCT VFR BLD AUTO: 41.8 % (ref 34–46.6)
HGB BLD-MCNC: 13.2 G/DL (ref 12–15.9)
IGA SERPL-MCNC: 150 MG/DL (ref 87–352)
IMM GRANULOCYTES # BLD AUTO: 0.02 10*3/MM3 (ref 0–0.05)
IMM GRANULOCYTES NFR BLD AUTO: 0.3 % (ref 0–0.5)
LYMPHOCYTES # BLD AUTO: 1.35 10*3/MM3 (ref 0.7–3.1)
LYMPHOCYTES NFR BLD AUTO: 17.4 % (ref 19.6–45.3)
MCH RBC QN AUTO: 27 PG (ref 26.6–33)
MCHC RBC AUTO-ENTMCNC: 31.6 G/DL (ref 31.5–35.7)
MCV RBC AUTO: 85.5 FL (ref 79–97)
MONOCYTES # BLD AUTO: 0.7 10*3/MM3 (ref 0.1–0.9)
MONOCYTES NFR BLD AUTO: 9 % (ref 5–12)
NEUTROPHILS # BLD AUTO: 5.46 10*3/MM3 (ref 1.7–7)
NEUTROPHILS NFR BLD AUTO: 70.6 % (ref 42.7–76)
NRBC BLD AUTO-RTO: 0 /100 WBC (ref 0–0.2)
PLATELET # BLD AUTO: 299 10*3/MM3 (ref 140–450)
POTASSIUM SERPL-SCNC: 4.4 MMOL/L (ref 3.5–5.2)
PROT SERPL-MCNC: 6.6 G/DL (ref 6–8.5)
RBC # BLD AUTO: 4.89 10*6/MM3 (ref 3.77–5.28)
SODIUM SERPL-SCNC: 139 MMOL/L (ref 136–145)
TSH SERPL DL<=0.005 MIU/L-ACNC: 1.59 UIU/ML (ref 0.27–4.2)
TTG IGA SER-ACNC: <2 U/ML (ref 0–3)
TTG IGG SER-ACNC: <2 U/ML (ref 0–5)
WBC # BLD AUTO: 7.74 10*3/MM3 (ref 3.4–10.8)

## 2022-04-29 RX ORDER — OMEPRAZOLE 40 MG/1
40 CAPSULE, DELAYED RELEASE ORAL DAILY
Qty: 90 CAPSULE | Refills: 3 | Status: SHIPPED | OUTPATIENT
Start: 2022-04-29 | End: 2022-10-19 | Stop reason: SDUPTHER

## 2022-05-17 ENCOUNTER — TELEPHONE (OUTPATIENT)
Dept: INTERNAL MEDICINE | Facility: CLINIC | Age: 27
End: 2022-05-17

## 2022-05-17 NOTE — TELEPHONE ENCOUNTER
Please call [facility] and give verbal on benadryl, viscous lidocaine, Maalox (equal parts), to be taken 5 mL qid prn mouth sores (15)ml total with no refills.

## 2022-05-18 RX ORDER — TRIAMCINOLONE ACETONIDE 0.1 %
PASTE (GRAM) DENTAL 2 TIMES DAILY
Qty: 5 G | Refills: 12 | Status: SHIPPED | OUTPATIENT
Start: 2022-05-18

## 2022-06-06 ENCOUNTER — OFFICE VISIT (OUTPATIENT)
Dept: INTERNAL MEDICINE | Facility: CLINIC | Age: 27
End: 2022-06-06

## 2022-06-06 VITALS
TEMPERATURE: 97.3 F | SYSTOLIC BLOOD PRESSURE: 108 MMHG | HEIGHT: 63 IN | OXYGEN SATURATION: 97 % | WEIGHT: 173 LBS | HEART RATE: 65 BPM | DIASTOLIC BLOOD PRESSURE: 66 MMHG | BODY MASS INDEX: 30.65 KG/M2

## 2022-06-06 DIAGNOSIS — N94.6 DYSMENORRHEA: Primary | ICD-10-CM

## 2022-06-06 DIAGNOSIS — K59.00 CONSTIPATION, UNSPECIFIED CONSTIPATION TYPE: ICD-10-CM

## 2022-06-06 PROCEDURE — 99213 OFFICE O/P EST LOW 20 MIN: CPT

## 2022-06-06 RX ORDER — POLYETHYLENE GLYCOL 3350 17 G/17G
17 POWDER, FOR SOLUTION ORAL DAILY
Qty: 510 G | Refills: 2 | Status: SHIPPED | OUTPATIENT
Start: 2022-06-06 | End: 2022-07-20

## 2022-06-06 RX ORDER — POLYETHYLENE GLYCOL 3350 17 G/17G
17 POWDER, FOR SOLUTION ORAL DAILY
Qty: 510 G | Refills: 2 | Status: SHIPPED | OUTPATIENT
Start: 2022-06-06 | End: 2022-06-06

## 2022-06-06 RX ORDER — MEFENAMIC ACID 250 MG/1
500 CAPSULE ORAL 3 TIMES DAILY PRN
Qty: 90 EACH | Refills: 0 | Status: SHIPPED | OUTPATIENT
Start: 2022-06-06 | End: 2022-07-05 | Stop reason: SDUPTHER

## 2022-06-06 RX ORDER — MEFENAMIC ACID 250 MG/1
500 CAPSULE ORAL 3 TIMES DAILY PRN
Qty: 90 EACH | Refills: 0 | Status: SHIPPED | OUTPATIENT
Start: 2022-06-06 | End: 2022-06-06

## 2022-06-06 NOTE — PROGRESS NOTES
Subjective   Adelaida Blair is a 26 y.o. female.   Chief Complaint   Patient presents with   • Dysmenorrhea       History of Present Illness   Ms. Blair present here today with complaints of dysmenorrhea. She reports that she has always had issues with this but ever since her Covid dx about a year ago they have been much worse and much more severe to the point where 800 mg ibuprofen does not give her relief. She does find some relief with heat at times.   She does have big clots when she bleeds, this is not new for her. She had a pelvic US in 5/2021 that was normal. She has actively been trying to get pregnant. She is currently not on birth control.   She also has been struggling with persistent constipation. She has taken miralax in the past and it takes several days to work, but then her cramps are more painful. She has found in the past if she is consistent with taking the miralax that it is more beneficial.     The following portions of the patient's history were reviewed and updated as appropriate: allergies, current medications, past family history, past medical history, past social history, past surgical history and problem list.    Review of Systems    Objective   Past Medical History:   Diagnosis Date   • GERD (gastroesophageal reflux disease)       Past Surgical History:   Procedure Laterality Date   • HEAD/NECK LESION/CYST EXCISION Left 11/12/2020    Procedure: Excision of lesion of the left jawline with linear closure;  Surgeon: David Aleman MD;  Location: [facility];  Service: ENT;  Laterality: Left;   • KIDNEY SURGERY      Removal   • MYRINGOTOMY W/ TUBES          Current Outpatient Medications:   •  cetirizine (zyrTEC) 10 MG tablet, Take 1 tablet by mouth Daily., Disp: 30 tablet, Rfl: 2  •  fluticasone (Flonase) 50 MCG/ACT nasal spray, 2 sprays into the nostril(s) as directed by provider Daily., Disp: 16 g, Rfl: 11  •  montelukast (Singulair) 10 MG tablet, Take 1 tablet by mouth Every Night., Disp:  "30 tablet, Rfl: 3  •  omeprazole (priLOSEC) 40 MG capsule, Take 1 capsule by mouth Daily., Disp: 90 capsule, Rfl: 3  •  ondansetron ODT (Zofran ODT) 4 MG disintegrating tablet, Place 1 tablet on the tongue Every 8 (Eight) Hours As Needed for Nausea or Vomiting., Disp: 10 tablet, Rfl: 0  •  triamcinolone (KENALOG) 0.1 % paste, Apply  to teeth 2 (Two) Times a Day., Disp: 5 g, Rfl: 12  •  azelastine (ASTELIN) 0.1 % nasal spray, 2 sprays into the nostril(s) as directed by provider 2 (Two) Times a Day. Use in each nostril as directed, Disp: 30 mL, Rfl: 12  •  buPROPion XL (Wellbutrin XL) 150 MG 24 hr tablet, Take 1 tablet by mouth Daily., Disp: 30 tablet, Rfl: 0  •  hyoscyamine (LEVSIN) 0.125 MG SL tablet, Take 1 tablet by mouth Every 4 (Four) Hours As Needed for Cramping., Disp: 30 tablet, Rfl: 0  •  Mefenamic Acid 250 MG capsule, Take 500 mg by mouth 3 (Three) Times a Day As Needed (dysmenorrhea)., Disp: 90 each, Rfl: 0  •  methocarbamol (ROBAXIN) 750 MG tablet, Take 1 tablet by mouth 3 (Three) Times a Day As Needed (cramps)., Disp: 30 tablet, Rfl: 0  •  polyethylene glycol (MIRALAX) 17 g packet, Take 17 g by mouth Daily., Disp: 90 packet, Rfl: 0      /66 (BP Location: Left arm, Patient Position: Sitting, Cuff Size: Adult)   Pulse 65   Temp 97.3 °F (36.3 °C) (Temporal)   Ht 160 cm (63\")   Wt 78.5 kg (173 lb)   LMP 06/05/2022   SpO2 97%   Breastfeeding No   BMI 30.65 kg/m²      Body mass index is 30.65 kg/m².         Physical Exam  Vitals and nursing note reviewed.   Constitutional:       General: She is not in acute distress.     Appearance: Normal appearance. She is normal weight. She is not ill-appearing, toxic-appearing or diaphoretic.   HENT:      Head: Normocephalic and atraumatic.   Eyes:      Extraocular Movements: Extraocular movements intact.      Conjunctiva/sclera: Conjunctivae normal.      Pupils: Pupils are equal, round, and reactive to light.   Cardiovascular:      Rate and Rhythm: Normal " rate and regular rhythm.      Pulses: Normal pulses.      Heart sounds: Normal heart sounds.   Pulmonary:      Effort: Pulmonary effort is normal.      Breath sounds: Normal breath sounds.   Abdominal:      General: Abdomen is flat.      Palpations: Abdomen is soft.      Tenderness: There is abdominal tenderness.   Musculoskeletal:         General: Normal range of motion.      Cervical back: Normal range of motion and neck supple.   Skin:     General: Skin is warm and dry.      Capillary Refill: Capillary refill takes 2 to 3 seconds.   Neurological:      General: No focal deficit present.      Mental Status: She is alert and oriented to person, place, and time. Mental status is at baseline.   Psychiatric:         Mood and Affect: Mood normal.         Behavior: Behavior normal.         Thought Content: Thought content normal.         Judgment: Judgment normal.               Assessment & Plan   Diagnoses and all orders for this visit:    1. Dysmenorrhea (Primary)  -     Mefenamic Acid 250 MG capsule; Take 500 mg by mouth 3 (Three) Times a Day As Needed (dysmenorrhea).  Dispense: 90 each; Refill: 0    2. Constipation, unspecified constipation type  -     polyethylene glycol (MIRALAX) 17 g packet; Take 17 g by mouth Daily.  Dispense: 90 packet; Refill: 0               Plan of care is reviewed with Ms. Blair. She prefers not to get any additional imaging done at this time, is seeking relief of cramps. I have asked her to stop the ibuprofen and we will trial Mefenamic acid 500 mg TID as needed for the cramping during menstrual cycle. I have advised taking this with food and to notify me of intolerance.   I have advised she start taking a daily probiotic to help with gut health.  Will also restart miralax. Can take several doses in a Gatorade to initiate bowels to move and then take 1-2 tsp daily there after.   Follow up in one month for annual physical with fasting labs prior. Can be seen prior to this as needed.

## 2022-06-10 ENCOUNTER — PATIENT ROUNDING (BHMG ONLY) (OUTPATIENT)
Dept: INTERNAL MEDICINE | Facility: CLINIC | Age: 27
End: 2022-06-10

## 2022-06-10 NOTE — PROGRESS NOTES
Jeanette 10, 2022    Hello, may I speak with Adelaida Blair?    My name is Molly      I am  with [facility]  [address] [place] [address] [phone].    Before we get started may I verify your date of birth? 1995    I am calling to ask about your recent visit. Is this a good time to talk? yes    Tell me about your visit with us. What things went well?  Yes everything went good.  No complaints       We're always looking for ways to make our patients' experiences even better. Do you have recommendations on ways we may improve?  no    Overall were you satisfied with your visit to our practice? yes       I appreciate you taking the time to speak with me today. Is there anything else I can do for you? no      Thank you, and have a great day.

## 2022-07-05 DIAGNOSIS — N94.6 DYSMENORRHEA: ICD-10-CM

## 2022-07-05 RX ORDER — MEFENAMIC ACID 250 MG/1
500 CAPSULE ORAL 3 TIMES DAILY PRN
Qty: 90 EACH | Refills: 0 | Status: SHIPPED | OUTPATIENT
Start: 2022-07-05 | End: 2022-07-06

## 2022-07-05 RX ORDER — MEFENAMIC ACID 250 MG/1
500 CAPSULE ORAL 3 TIMES DAILY PRN
Qty: 90 EACH | Refills: 0 | Status: SHIPPED | OUTPATIENT
Start: 2022-07-05 | End: 2022-07-05 | Stop reason: SDUPTHER

## 2022-07-06 RX ORDER — DICLOFENAC POTASSIUM 50 MG/1
50 TABLET, FILM COATED ORAL 3 TIMES DAILY PRN
Qty: 30 TABLET | Refills: 3 | Status: SHIPPED | OUTPATIENT
Start: 2022-07-06

## 2022-07-21 ENCOUNTER — OFFICE VISIT (OUTPATIENT)
Dept: INTERNAL MEDICINE | Facility: CLINIC | Age: 27
End: 2022-07-21

## 2022-07-21 VITALS
HEART RATE: 83 BPM | OXYGEN SATURATION: 98 % | TEMPERATURE: 97.1 F | DIASTOLIC BLOOD PRESSURE: 68 MMHG | HEIGHT: 63 IN | WEIGHT: 168.6 LBS | BODY MASS INDEX: 29.88 KG/M2 | SYSTOLIC BLOOD PRESSURE: 110 MMHG

## 2022-07-21 DIAGNOSIS — N92.0 MENORRHAGIA WITH REGULAR CYCLE: ICD-10-CM

## 2022-07-21 DIAGNOSIS — F41.9 ANXIETY: ICD-10-CM

## 2022-07-21 DIAGNOSIS — Z11.59 NEED FOR HEPATITIS C SCREENING TEST: ICD-10-CM

## 2022-07-21 DIAGNOSIS — Z00.00 ROUTINE GENERAL MEDICAL EXAMINATION AT A HEALTH CARE FACILITY: ICD-10-CM

## 2022-07-21 DIAGNOSIS — Z12.4 ENCOUNTER FOR SCREENING FOR CERVICAL CANCER: Primary | ICD-10-CM

## 2022-07-21 DIAGNOSIS — O26.90 PREGNANCY-RELATED SYMPTOM, ANTEPARTUM: ICD-10-CM

## 2022-07-21 PROCEDURE — 99214 OFFICE O/P EST MOD 30 MIN: CPT | Performed by: NURSE PRACTITIONER

## 2022-07-21 RX ORDER — NORGESTIMATE AND ETHINYL ESTRADIOL 0.25-0.035
1 KIT ORAL DAILY
Qty: 28 TABLET | Refills: 12 | Status: SHIPPED | OUTPATIENT
Start: 2022-07-21 | End: 2022-09-01

## 2022-07-21 RX ORDER — ONDANSETRON 4 MG/1
4 TABLET, ORALLY DISINTEGRATING ORAL EVERY 8 HOURS PRN
Qty: 10 TABLET | Refills: 0 | Status: SHIPPED | OUTPATIENT
Start: 2022-07-21

## 2022-07-21 RX ORDER — HYDROXYZINE PAMOATE 25 MG/1
25 CAPSULE ORAL 3 TIMES DAILY PRN
Qty: 90 CAPSULE | Refills: 2 | Status: SHIPPED | OUTPATIENT
Start: 2022-07-21 | End: 2022-08-25

## 2022-07-21 NOTE — PROGRESS NOTES
Subjective     Chief Complaint:  Heavy periods, anxiety/depression     HPI:  Patient resents today today with complaints of heavy bleeding with periods, especially with increased clotting.  She does have very painful cramps with her periods and she has been using Mefenamic acid for this.  She is not currently on birth control and is not up-to-date on Pap smear.  She denies having pain with intercourse.  She has previously had difficulty with dizziness and nausea with birth control, but is unsure what kind of birth control she was on.    The patient also has some difficulty with anxiety and depression..  She has tried taking Trintellix, Zoloft, and BuSpar in the past all of which she did not feel were very effective.  She would prefer to take something as needed for anxiety rather than having to take medication every day for this.  She does have some difficulty with grinding her teeth at night and chattering her teeth while asleep.  She states her jaw does not typically hurt in the mornings.    She does have a history of left kidney cyst and is supposed to have a follow-up CT of the abdomen and pelvis to follow-up with this tomorrow.  She also sees Dr. Stone with nephrology for this.    Patient's PMR from outside medical facility reviewed and noted.    Past Medical History:   Past Medical History:   Diagnosis Date   • GERD (gastroesophageal reflux disease)      Past Surgical History:  Past Surgical History:   Procedure Laterality Date   • HEAD/NECK LESION/CYST EXCISION Left 11/12/2020    Procedure: Excision of lesion of the left jawline with linear closure;  Surgeon: David Aleman MD;  Location: [facility];  Service: ENT;  Laterality: Left;   • KIDNEY SURGERY Left     partial   • MYRINGOTOMY W/ TUBES       Social History:  reports that she has never smoked. She has never used smokeless tobacco. She reports current alcohol use. She reports that she does not use drugs.    Family History: family history includes  "Diabetes in her father; Hyperlipidemia in her father; Skin cancer in her father.      Allergies:  No Known Allergies  Medications:  Prior to Admission medications    Medication Sig Start Date End Date Taking? Authorizing Provider   cetirizine (zyrTEC) 10 MG tablet Take 1 tablet by mouth Daily. 3/14/22  Yes Francheska Montana APRN   diclofenac (Cataflam) 50 MG tablet Take 1 tablet by mouth 3 (Three) Times a Day As Needed (menstural cramps). 7/6/22  Yes Reanna Pearl DO   fluticasone (Flonase) 50 MCG/ACT nasal spray 2 sprays into the nostril(s) as directed by provider Daily. 3/17/22  Yes Lucita Reyes APRN   metFORMIN (Glucophage) 500 MG tablet Take 1 tablet by mouth Daily With Breakfast. 7/11/22  Yes Hiral Singh APRN   montelukast (Singulair) 10 MG tablet Take 1 tablet by mouth Every Night. 3/21/22  Yes Reanna Pearl DO   omeprazole (priLOSEC) 40 MG capsule Take 1 capsule by mouth Daily. 4/29/22  Yes Jessica Smilye APRN   ondansetron ODT (Zofran ODT) 4 MG disintegrating tablet Place 1 tablet on the tongue Every 8 (Eight) Hours As Needed for Nausea or Vomiting. 7/21/22  Yes Hiral Singh APRN   triamcinolone (KENALOG) 0.1 % paste Apply  to teeth 2 (Two) Times a Day. 5/18/22  Yes Roxie Encarnacion APRN   ondansetron ODT (Zofran ODT) 4 MG disintegrating tablet Place 1 tablet on the tongue Every 8 (Eight) Hours As Needed for Nausea or Vomiting. 12/28/21 7/21/22 Yes Francheska Montana APRN   hydrOXYzine pamoate (Vistaril) 25 MG capsule Take 1 capsule by mouth 3 (Three) Times a Day As Needed for Anxiety. 7/21/22   Hiral Singh APRN   norgestimate-ethinyl estradiol (ORTHO-CYCLEN) 0.25-35 MG-MCG per tablet Take 1 tablet by mouth Daily. 7/21/22   Hiral Singh APRN       Objective     Vital Signs: /68 (BP Location: Left arm, Patient Position: Sitting, Cuff Size: Adult)   Pulse 83   Temp 97.1 °F (36.2 °C) (Temporal)   Ht 160 cm (63\")   Wt 76.5 kg (168 lb 9.6 " oz)   LMP 07/05/2022 (Exact Date)   SpO2 98%   Breastfeeding No   BMI 29.87 kg/m²   Physical Exam  Vitals and nursing note reviewed.   Constitutional:       General: She is not in acute distress.     Appearance: She is not ill-appearing or toxic-appearing.   HENT:      Head: Normocephalic and atraumatic.      Mouth/Throat:      Mouth: Mucous membranes are moist.      Pharynx: Oropharynx is clear.   Cardiovascular:      Rate and Rhythm: Normal rate and regular rhythm.      Pulses: Normal pulses.      Heart sounds: Normal heart sounds.   Pulmonary:      Effort: Pulmonary effort is normal.      Breath sounds: No wheezing, rhonchi or rales.   Abdominal:      General: Bowel sounds are normal. There is no distension.      Palpations: Abdomen is soft.      Tenderness: There is no abdominal tenderness.   Genitourinary:     Labia:         Right: No tenderness or lesion.         Left: No tenderness or lesion.       Vagina: Normal.      Cervix: Cervical bleeding present. No discharge, lesion or erythema.      Uterus: Normal.    Musculoskeletal:         General: No swelling or tenderness. Normal range of motion.      Cervical back: Normal range of motion and neck supple. No tenderness.   Skin:     General: Skin is warm and dry.      Findings: No erythema or rash.   Neurological:      General: No focal deficit present.      Mental Status: She is alert and oriented to person, place, and time.   Psychiatric:         Mood and Affect: Mood normal.         Behavior: Behavior normal.         Thought Content: Thought content normal.         Judgment: Judgment normal.       BMI is >= 25 and <30. (Overweight) The following options were offered after discussion;: exercise counseling/recommendations and nutrition counseling/recommendations    Results Reviewed:  Reviewed last labs available from 3/29/2022-CMP within normal limits, hemoglobin A1c 5.6, TSH normal, CBC within normal limits.    Assessment / Plan      Assessment/Plan:  Diagnoses and all orders for this visit:    1. Encounter for screening for cervical cancer (Primary)  -     IGP,rfx Aptima HPV All Pth    2. Routine general medical examination at a health care facility  -     Comprehensive Metabolic Panel  -     CBC & Differential  -     Lipid Panel  -     TSH  -     Urinalysis With Microscopic - Urine, Clean Catch  -     IGP,rfx Aptima HPV All Pth  -     Hemoglobin A1c  -     Vitamin D 25 hydroxy    3. Need for hepatitis C screening test  -     Cancel: Hepatitis C Antibody    4. Menorrhagia with regular cycle    5. Pregnancy-related symptom, antepartum  -     hCG, Serum, Qualitative    6. Anxiety    Other orders  -     ondansetron ODT (Zofran ODT) 4 MG disintegrating tablet; Place 1 tablet on the tongue Every 8 (Eight) Hours As Needed for Nausea or Vomiting.  Dispense: 10 tablet; Refill: 0  -     hydrOXYzine pamoate (Vistaril) 25 MG capsule; Take 1 capsule by mouth 3 (Three) Times a Day As Needed for Anxiety.  Dispense: 90 capsule; Refill: 2  -     norgestimate-ethinyl estradiol (ORTHO-CYCLEN) 0.25-35 MG-MCG per tablet; Take 1 tablet by mouth Daily.  Dispense: 28 tablet; Refill: 12  -     Microscopic Examination -  -     Hepatitis C Antibody  -     Please Note  -     Written Authorization       Patient presents to the office today for cervical cancer screening as she is overdue with Pap smear.  This was performed with no abnormalities noted.  We will follow-up with results.  We will trial birth control for the patient to see if this helps with painful cramping and increased bleeding.  Patient does have an appointment with OB/GYN upcoming.  May be able to offer more insight.    Laboratory studies to be performed as above, we will follow-up with results and make changes to plan of care as necessary.    The patient would prefer to take something as needed for anxiety, rather than something scheduled daily.  We will trial Vistaril, which I think may help her  sleep better at night as well.    1 year for annual physical, unless patient needs to be seen sooner or acute issues arise.    I have discussed the patient results/orders and and plan/recommendation with them at today's visit.      Hiral Singh, APRN   07/21/2022

## 2022-07-22 ENCOUNTER — APPOINTMENT (OUTPATIENT)
Dept: CT IMAGING | Facility: HOSPITAL | Age: 27
End: 2022-07-22

## 2022-07-22 ENCOUNTER — HOSPITAL ENCOUNTER (OUTPATIENT)
Dept: CT IMAGING | Facility: HOSPITAL | Age: 27
Discharge: HOME | End: 2022-07-22
Admitting: INTERNAL MEDICINE

## 2022-07-22 LAB
25(OH)D3+25(OH)D2 SERPL-MCNC: 27.8 NG/ML (ref 30–100)
ALBUMIN SERPL-MCNC: 4.4 G/DL (ref 3.5–5.2)
ALBUMIN/GLOB SERPL: 2.1 G/DL
ALP SERPL-CCNC: 50 U/L (ref 39–117)
ALT SERPL-CCNC: 11 U/L (ref 1–33)
APPEARANCE UR: CLEAR
AST SERPL-CCNC: 13 U/L (ref 1–32)
B-HCG SERPL QL: NEGATIVE
BACTERIA #/AREA URNS HPF: NORMAL /HPF
BASOPHILS # BLD AUTO: 0.03 10*3/MM3 (ref 0–0.2)
BASOPHILS NFR BLD AUTO: 0.2 % (ref 0–1.5)
BILIRUB SERPL-MCNC: 0.3 MG/DL (ref 0–1.2)
BILIRUB UR QL STRIP: NEGATIVE
BUN SERPL-MCNC: 11 MG/DL (ref 6–20)
BUN/CREAT SERPL: 18.3 (ref 7–25)
CALCIUM SERPL-MCNC: 9.2 MG/DL (ref 8.6–10.5)
CHLORIDE SERPL-SCNC: 102 MMOL/L (ref 98–107)
CHOLEST SERPL-MCNC: 151 MG/DL (ref 0–200)
CO2 SERPL-SCNC: 25 MMOL/L (ref 22–29)
COLOR UR: YELLOW
CREAT BLDA-MCNC: 0.6 MG/DL (ref 0.6–1.3)
CREAT SERPL-MCNC: 0.6 MG/DL (ref 0.57–1)
EGFRCR SERPLBLD CKD-EPI 2021: 126.3 ML/MIN/1.73
EOSINOPHIL # BLD AUTO: 0.08 10*3/MM3 (ref 0–0.4)
EOSINOPHIL NFR BLD AUTO: 0.6 % (ref 0.3–6.2)
EPI CELLS #/AREA URNS HPF: NORMAL /HPF
ERYTHROCYTE [DISTWIDTH] IN BLOOD BY AUTOMATED COUNT: 14 % (ref 12.3–15.4)
GLOBULIN SER CALC-MCNC: 2.1 GM/DL
GLUCOSE SERPL-MCNC: 96 MG/DL (ref 65–99)
GLUCOSE UR QL STRIP: NEGATIVE
HBA1C MFR BLD: 5.5 % (ref 4.8–5.6)
HCT VFR BLD AUTO: 41 % (ref 34–46.6)
HCV AB S/CO SERPL IA: <0.1 S/CO RATIO (ref 0–0.9)
HDLC SERPL-MCNC: 46 MG/DL (ref 40–60)
HGB BLD-MCNC: 13.6 G/DL (ref 12–15.9)
HGB UR QL STRIP: NEGATIVE
IMM GRANULOCYTES # BLD AUTO: 0.04 10*3/MM3 (ref 0–0.05)
IMM GRANULOCYTES NFR BLD AUTO: 0.3 % (ref 0–0.5)
KETONES UR QL STRIP: NEGATIVE
LDLC SERPL CALC-MCNC: 83 MG/DL (ref 0–100)
LEUKOCYTE ESTERASE UR QL STRIP: NEGATIVE
LYMPHOCYTES # BLD AUTO: 1.19 10*3/MM3 (ref 0.7–3.1)
LYMPHOCYTES NFR BLD AUTO: 9.4 % (ref 19.6–45.3)
Lab: NORMAL
MCH RBC QN AUTO: 26.7 PG (ref 26.6–33)
MCHC RBC AUTO-ENTMCNC: 33.2 G/DL (ref 31.5–35.7)
MCV RBC AUTO: 80.4 FL (ref 79–97)
MONOCYTES # BLD AUTO: 0.78 10*3/MM3 (ref 0.1–0.9)
MONOCYTES NFR BLD AUTO: 6.2 % (ref 5–12)
NEUTROPHILS # BLD AUTO: 10.56 10*3/MM3 (ref 1.7–7)
NEUTROPHILS NFR BLD AUTO: 83.3 % (ref 42.7–76)
NITRITE UR QL STRIP: NEGATIVE
NRBC BLD AUTO-RTO: 0 /100 WBC (ref 0–0.2)
PH UR STRIP: 7 [PH] (ref 5–8)
PLATELET # BLD AUTO: 333 10*3/MM3 (ref 140–450)
POTASSIUM SERPL-SCNC: 4.4 MMOL/L (ref 3.5–5.2)
PROT SERPL-MCNC: 6.5 G/DL (ref 6–8.5)
PROT UR QL STRIP: NEGATIVE
RBC # BLD AUTO: 5.1 10*6/MM3 (ref 3.77–5.28)
RBC #/AREA URNS HPF: NORMAL /HPF
SODIUM SERPL-SCNC: 137 MMOL/L (ref 136–145)
SP GR UR STRIP: 1.02 (ref 1–1.03)
TRIGL SERPL-MCNC: 125 MG/DL (ref 0–150)
TSH SERPL DL<=0.005 MIU/L-ACNC: 0.97 UIU/ML (ref 0.27–4.2)
UROBILINOGEN UR STRIP-MCNC: NORMAL MG/DL
VLDLC SERPL CALC-MCNC: 22 MG/DL (ref 5–40)
WBC # BLD AUTO: 12.68 10*3/MM3 (ref 3.4–10.8)
WBC #/AREA URNS HPF: NORMAL /HPF
WRITTEN AUTHORIZATION: NORMAL

## 2022-07-22 PROCEDURE — 82565 ASSAY OF CREATININE: CPT

## 2022-07-22 PROCEDURE — 74178 CT ABD&PLV WO CNTR FLWD CNTR: CPT

## 2022-07-22 PROCEDURE — 25010000002 IOPAMIDOL 61 % SOLUTION: Performed by: INTERNAL MEDICINE

## 2022-07-22 RX ORDER — CHOLECALCIFEROL (VITAMIN D3) 50 MCG
2000 TABLET ORAL DAILY
Start: 2022-07-22 | End: 2023-07-22

## 2022-07-22 RX ADMIN — IOPAMIDOL 100 ML: 612 INJECTION, SOLUTION INTRAVENOUS at 08:06

## 2022-07-22 NOTE — PROGRESS NOTES
Vitamin D is low, discussed with patient to take 2000 units daily OTC supplement and will recheck this in 3 months. CMP, Lipid panel,TSH, Hgb A1c within normal limits. Elevated WBC with left shift noted. UA is normal. May be reactive from having Pap smear performed that morning. Can reassess in 1 week and if still abnormal will send for peripheral smear and reassess patient's symptoms.

## 2022-07-26 LAB
CONV .: NORMAL
CYTOLOGIST CVX/VAG CYTO: NORMAL
CYTOLOGY CVX/VAG DOC CYTO: NORMAL
CYTOLOGY CVX/VAG DOC THIN PREP: NORMAL
DX ICD CODE: NORMAL
HIV 1 & 2 AB SER-IMP: NORMAL
Lab: NORMAL
OTHER STN SPEC: NORMAL
STAT OF ADQ CVX/VAG CYTO-IMP: NORMAL

## 2022-07-29 DIAGNOSIS — M79.641 PAIN IN BOTH HANDS: Primary | ICD-10-CM

## 2022-07-29 DIAGNOSIS — M79.642 PAIN IN BOTH HANDS: Primary | ICD-10-CM

## 2022-08-04 ENCOUNTER — OFFICE VISIT (OUTPATIENT)
Dept: OBSTETRICS AND GYNECOLOGY | Facility: CLINIC | Age: 27
End: 2022-08-04

## 2022-08-04 VITALS
BODY MASS INDEX: 30.12 KG/M2 | HEIGHT: 63 IN | WEIGHT: 170 LBS | SYSTOLIC BLOOD PRESSURE: 100 MMHG | DIASTOLIC BLOOD PRESSURE: 68 MMHG

## 2022-08-04 DIAGNOSIS — R10.2 PELVIC PAIN: ICD-10-CM

## 2022-08-04 DIAGNOSIS — N92.0 MENORRHAGIA WITH REGULAR CYCLE: Primary | ICD-10-CM

## 2022-08-04 DIAGNOSIS — Z31.69 PRE-CONCEPTION COUNSELING: ICD-10-CM

## 2022-08-04 DIAGNOSIS — D72.829 LEUKOCYTOSIS, UNSPECIFIED TYPE: Primary | ICD-10-CM

## 2022-08-04 DIAGNOSIS — Z78.9 NON-SMOKER: ICD-10-CM

## 2022-08-04 DIAGNOSIS — N94.6 DYSMENORRHEA: ICD-10-CM

## 2022-08-04 PROCEDURE — 99213 OFFICE O/P EST LOW 20 MIN: CPT | Performed by: ADVANCED PRACTICE MIDWIFE

## 2022-08-04 NOTE — PROGRESS NOTES
"Zakia Blair is a 26 y.o. female    Patient arrived for a gyne appointment to establish care with concerns of dysmenorrhea. She was seen by her primary care provider on 07/21/2022, who placed her on birth control and completed her pap smear. She has not yet started the birth control given at her annual examination, which she relates was for the purpose of helping with control of menses for an upcoming vacation. Reports she has always had pain with her cycles since onset. She usually takes 800 mg once a day. For the last year and a half, the patient's periods have been regular. The bleeding lasts 7 days. It begins heavy with clots for the first couple of days and then tapers. The cramps are severe - \"excruciating pain.\"    She does have a desire to conceive and has been taking her prenatal vitamin.         /68   Ht 160 cm (63\")   Wt 77.1 kg (170 lb)   LMP 08/03/2022   BMI 30.11 kg/m²     Outpatient Encounter Medications as of 8/4/2022   Medication Sig Dispense Refill   • cetirizine (zyrTEC) 10 MG tablet Take 1 tablet by mouth Daily. 30 tablet 2   • Cholecalciferol (Vitamin D) 50 MCG (2000 UT) tablet Take 2,000 Units by mouth Daily.     • Diclofenac Sodium (Voltaren) 1 % gel gel Apply 4 g topically to the appropriate area as directed 4 (Four) Times a Day As Needed (joint pain). 300 g 2   • fluticasone (Flonase) 50 MCG/ACT nasal spray 2 sprays into the nostril(s) as directed by provider Daily. 16 g 11   • metFORMIN (Glucophage) 500 MG tablet Take 1 tablet by mouth Daily With Breakfast. 30 tablet 2   • montelukast (Singulair) 10 MG tablet Take 1 tablet by mouth Every Night. 30 tablet 3   • omeprazole (priLOSEC) 40 MG capsule Take 1 capsule by mouth Daily. 90 capsule 3   • ondansetron ODT (Zofran ODT) 4 MG disintegrating tablet Place 1 tablet on the tongue Every 8 (Eight) Hours As Needed for Nausea or Vomiting. 10 tablet 0   • triamcinolone (KENALOG) 0.1 % paste Apply  to teeth 2 (Two) Times a " Day. 5 g 12   • diclofenac (Cataflam) 50 MG tablet Take 1 tablet by mouth 3 (Three) Times a Day As Needed (menstural cramps). 30 tablet 3   • hydrOXYzine pamoate (Vistaril) 25 MG capsule Take 1 capsule by mouth 3 (Three) Times a Day As Needed for Anxiety. 90 capsule 2   • norgestimate-ethinyl estradiol (ORTHO-CYCLEN) 0.25-35 MG-MCG per tablet Take 1 tablet by mouth Daily. 28 tablet 12     No facility-administered encounter medications on file as of 8/4/2022.       Surgical History  Past Surgical History:   Procedure Laterality Date   • HEAD/NECK LESION/CYST EXCISION Left 11/12/2020    Procedure: Excision of lesion of the left jawline with linear closure;  Surgeon: David Aleman MD;  Location: [facility];  Service: ENT;  Laterality: Left;   • KIDNEY SURGERY Left     partial   • MYRINGOTOMY W/ TUBES         Family History  Family History   Problem Relation Age of Onset   • Diabetes Father    • Hyperlipidemia Father    • Skin cancer Father        The following portions of the patient's history were reviewed and updated as appropriate: allergies, current medications, past family history, past medical history, past social history, past surgical history, and problem list.    Review of Systems   Constitutional: Positive for fatigue. Negative for chills and fever.   Respiratory: Negative for cough and chest tightness.    Cardiovascular: Negative for chest pain and palpitations.   Gastrointestinal: Negative for abdominal pain, constipation, diarrhea, nausea and vomiting.   Endocrine: Negative for cold intolerance and heat intolerance.   Genitourinary: Positive for menstrual problem, pelvic pain and vaginal bleeding. Negative for difficulty urinating, dysuria, flank pain, frequency, pelvic pressure, urgency, vaginal discharge and vaginal pain.   Musculoskeletal: Negative for gait problem.   Skin: Negative for pallor, rash and skin lesions.        Darkened skin in the folds of her thighs/groin. Hair around the nipple area -  left nipple   Allergic/Immunologic: Negative for environmental allergies, food allergies and immunocompromised state.   Neurological: Negative for dizziness, light-headedness and headache.   Hematological: Negative for adenopathy.   Psychiatric/Behavioral: Negative for dysphoric mood, self-injury, suicidal ideas and depressed mood. The patient is not nervous/anxious.        Objective   Physical Exam  Vitals and nursing note reviewed.   Constitutional:       General: She is not in acute distress.     Appearance: Normal appearance. She is well-developed and well-groomed. She is obese. She is not ill-appearing or diaphoretic.   HENT:      Head: Normocephalic and atraumatic.      Right Ear: External ear normal.      Left Ear: External ear normal.   Eyes:      General: Lids are normal. No scleral icterus.        Right eye: No discharge.         Left eye: No discharge.      Extraocular Movements: Extraocular movements intact.      Conjunctiva/sclera: Conjunctivae normal.   Neck:      Trachea: Phonation normal.   Cardiovascular:      Rate and Rhythm: Normal rate and regular rhythm.      Heart sounds: Normal heart sounds. No murmur heard.  Pulmonary:      Effort: Pulmonary effort is normal. No accessory muscle usage or respiratory distress.      Breath sounds: Normal breath sounds and air entry. No wheezing.   Chest:      Chest wall: No tenderness.   Abdominal:      General: There is no distension.      Palpations: Abdomen is soft.      Tenderness: There is abdominal tenderness in the left lower quadrant. There is no right CVA tenderness, left CVA tenderness, guarding or rebound.   Musculoskeletal:         General: No tenderness. Normal range of motion.      Cervical back: Normal range of motion and neck supple. No rigidity or tenderness. No pain with movement. Normal range of motion.      Right lower leg: No edema.      Left lower leg: No edema.   Skin:     General: Skin is warm and dry.      Coloration: Skin is not ashen,  cyanotic, jaundiced, mottled, pale or sallow.      Findings: No bruising, erythema, lesion or rash.   Neurological:      Mental Status: She is alert and oriented to person, place, and time.      Gait: Gait normal.   Psychiatric:         Attention and Perception: Attention and perception normal.         Mood and Affect: Mood normal.         Speech: Speech normal.         Behavior: Behavior normal. Behavior is cooperative.         Thought Content: Thought content normal.         Cognition and Memory: Cognition and memory normal.         Judgment: Judgment normal.       CT of Pelvis 07/22/2022): Small amount of free fluid is noted within the cul-de-sac felt to be physiologic. Small cysts or dominant follicles are noted of each ovary.    Chart reviewed for screenings  Last Pap Smear: 07/21/2022 NILM   Last Mammogram: Not yet indicated. Denies family history of breast cancer.   Last Colonoscopy: Not yet indicated. Denies family history of colon cancer.   Last Bone Density Scan: Not yet indicated.       Assessment & Plan   Diagnoses and all orders for this visit:    1. Menorrhagia with regular cycle (Primary)   - Patient with menorrhagia that was irregular, but has become regular. Experiences dysmenorrhea. Discussed measures of support for menses, including hormone therapy, NSAIDs, and heat. She does not desire to continually use hormone therapy as she is trying to conceive. Discussed healthier BMI can also help with menses. Patient is presently on Metformin for weight management support. Labs discussed and ordered for today.  -     Comprehensive Metabolic Panel  -     Estradiol  -     Follicle Stimulating Hormone  -     Insulin, Total  -     Luteinizing Hormone  -     Progesterone  -     Testosterone  -     DHEA-Sulfate  -     Cortisol  -     Hemoglobin A1c  -     Prolactin  -     17-Hydroxyprogesterone    2. Dysmenorrhea  - Discussed with patient measures of support for dysmenorrhea, including NSAIDs and heat. Patient  has only been taking motrin one time per day, but discussed she make take up to 800 mg every 8 hours during her menstrual cycle.  - Return to clinic if signs and symptoms persist or increase.    3. Pelvic pain  - Plan to have patient return with pelvic ultrasound for pelvic pain/left lower quadrant pain elicited on examination and as follow-up of CT scan.    4. Pre-conception counseling  - Discussed healthy lifestyle, including promoting a healthy BMI, healthy dietary selections, portion control, adequate hydration, regular exercise, and healthy support system. Promoted personal well-being physically and mentally/emotionally. Encouraged to avoid alcohol and smoking. Discussed taking a prenatal vitamin at the onset of attempts to conceive. Discussed also folic acid for the prevention of neural tube defects.   - Instructed on smartphone applications to aid with tracking menstrual cycles, assisting with determine optimal times of ovulation. Also discussed use of ovulation test kits if patient desires.    5. Non-smoker         BMI is >= 30 and <35. (Class 1 Obesity). The following options were offered after discussion;: exercise counseling/recommendations and nutrition counseling/recommendations      This note has been signed electronically.    Johana Mahoney, DNP, APRN, CNM, RNC-OB  8/4/2022

## 2022-08-08 LAB
17OHP SERPL-MCNC: 54 NG/DL
ALBUMIN SERPL-MCNC: 4.5 G/DL (ref 3.5–5.2)
ALBUMIN/GLOB SERPL: 2.4 G/DL
ALP SERPL-CCNC: 48 U/L (ref 39–117)
ALT SERPL-CCNC: 12 U/L (ref 1–33)
AST SERPL-CCNC: 12 U/L (ref 1–32)
BILIRUB SERPL-MCNC: 0.4 MG/DL (ref 0–1.2)
BUN SERPL-MCNC: 10 MG/DL (ref 6–20)
BUN/CREAT SERPL: 14.7 (ref 7–25)
CALCIUM SERPL-MCNC: 8.9 MG/DL (ref 8.6–10.5)
CHLORIDE SERPL-SCNC: 104 MMOL/L (ref 98–107)
CO2 SERPL-SCNC: 26.5 MMOL/L (ref 22–29)
CORTIS SERPL-MCNC: 7.5 UG/DL
CREAT SERPL-MCNC: 0.68 MG/DL (ref 0.57–1)
DHEA-S SERPL-MCNC: 151 UG/DL (ref 84.8–378)
EGFRCR SERPLBLD CKD-EPI 2021: 122.6 ML/MIN/1.73
ESTRADIOL SERPL-MCNC: 23.2 PG/ML
FSH SERPL-ACNC: 4.4 MIU/ML
GLOBULIN SER CALC-MCNC: 1.9 GM/DL
GLUCOSE SERPL-MCNC: 91 MG/DL (ref 65–99)
HBA1C MFR BLD: 5.5 % (ref 4.8–5.6)
INSULIN SERPL-ACNC: 9.7 UIU/ML (ref 2.6–24.9)
LH SERPL-ACNC: 4.2 MIU/ML
POTASSIUM SERPL-SCNC: 4.1 MMOL/L (ref 3.5–5.2)
PROGEST SERPL-MCNC: 0.1 NG/ML
PROLACTIN SERPL-MCNC: 16.9 NG/ML (ref 4.8–23.3)
PROT SERPL-MCNC: 6.4 G/DL (ref 6–8.5)
SODIUM SERPL-SCNC: 139 MMOL/L (ref 136–145)
TESTOST SERPL-MCNC: 17 NG/DL (ref 13–71)

## 2022-08-23 DIAGNOSIS — D72.829 LEUKOCYTOSIS, UNSPECIFIED TYPE: Primary | ICD-10-CM

## 2022-08-25 ENCOUNTER — OFFICE VISIT (OUTPATIENT)
Dept: OBSTETRICS AND GYNECOLOGY | Facility: CLINIC | Age: 27
End: 2022-08-25

## 2022-08-25 VITALS
DIASTOLIC BLOOD PRESSURE: 82 MMHG | SYSTOLIC BLOOD PRESSURE: 116 MMHG | BODY MASS INDEX: 30.12 KG/M2 | HEIGHT: 63 IN | WEIGHT: 170 LBS

## 2022-08-25 DIAGNOSIS — N94.6 DYSMENORRHEA: ICD-10-CM

## 2022-08-25 DIAGNOSIS — Z78.9 NON-SMOKER: ICD-10-CM

## 2022-08-25 DIAGNOSIS — N92.0 MENORRHAGIA WITH REGULAR CYCLE: Primary | ICD-10-CM

## 2022-08-25 DIAGNOSIS — Z31.69 PRE-CONCEPTION COUNSELING: ICD-10-CM

## 2022-08-25 PROCEDURE — 99213 OFFICE O/P EST LOW 20 MIN: CPT | Performed by: ADVANCED PRACTICE MIDWIFE

## 2022-08-25 NOTE — PROGRESS NOTES
"Zakia Blair is a 26 y.o. female    Patient arrived for a gyne appointment with an ultrasound for menorrhagia with dysmenorrhea. She relates she has not had any changes since her last visit, as she has not had another menstrual cycle since that visit.         /82 (BP Location: Left arm, Patient Position: Sitting, Cuff Size: Adult)   Ht 160 cm (63\")   Wt 77.1 kg (170 lb)   LMP 08/03/2022 (Exact Date)   Breastfeeding No   BMI 30.11 kg/m²     Outpatient Encounter Medications as of 8/25/2022   Medication Sig Dispense Refill   • cetirizine (zyrTEC) 10 MG tablet Take 1 tablet by mouth Daily. 30 tablet 2   • Cholecalciferol (Vitamin D) 50 MCG (2000 UT) tablet Take 2,000 Units by mouth Daily.     • diclofenac (Cataflam) 50 MG tablet Take 1 tablet by mouth 3 (Three) Times a Day As Needed (menstural cramps). 30 tablet 3   • Diclofenac Sodium (Voltaren) 1 % gel gel Apply 4 g topically to the appropriate area as directed 4 (Four) Times a Day As Needed (joint pain). 300 g 2   • fluticasone (Flonase) 50 MCG/ACT nasal spray 2 sprays into the nostril(s) as directed by provider Daily. 16 g 11   • metFORMIN (Glucophage) 500 MG tablet Take 1 tablet by mouth Daily With Breakfast. 30 tablet 2   • montelukast (Singulair) 10 MG tablet Take 1 tablet by mouth Every Night. 30 tablet 3   • omeprazole (priLOSEC) 40 MG capsule Take 1 capsule by mouth Daily. 90 capsule 3   • ondansetron ODT (Zofran ODT) 4 MG disintegrating tablet Place 1 tablet on the tongue Every 8 (Eight) Hours As Needed for Nausea or Vomiting. 10 tablet 0   • Prenatal Vit-Fe Fumarate-FA (PRENATAL VITAMIN AND MINERAL PO) Take  by mouth.     • triamcinolone (KENALOG) 0.1 % paste Apply  to teeth 2 (Two) Times a Day. 5 g 12   • norgestimate-ethinyl estradiol (ORTHO-CYCLEN) 0.25-35 MG-MCG per tablet Take 1 tablet by mouth Daily. 28 tablet 12   • [DISCONTINUED] hydrOXYzine pamoate (Vistaril) 25 MG capsule Take 1 capsule by mouth 3 (Three) Times a Day As " Needed for Anxiety. 90 capsule 2     No facility-administered encounter medications on file as of 8/25/2022.       Surgical History  Past Surgical History:   Procedure Laterality Date   • HEAD/NECK LESION/CYST EXCISION Left 11/12/2020    Procedure: Excision of lesion of the left jawline with linear closure;  Surgeon: David Aleman MD;  Location: [facility];  Service: ENT;  Laterality: Left;   • KIDNEY SURGERY Left     partial   • MYRINGOTOMY W/ TUBES         Family History  Family History   Problem Relation Age of Onset   • Diabetes Father    • Hyperlipidemia Father    • Skin cancer Father        The following portions of the patient's history were reviewed and updated as appropriate: allergies, current medications, past family history, past medical history, past social history, past surgical history, and problem list.    Review of Systems   Constitutional: Negative for chills, fatigue and fever.   Respiratory: Negative for chest tightness and shortness of breath.    Cardiovascular: Negative for chest pain, palpitations and leg swelling.   Gastrointestinal: Negative for abdominal pain, constipation, diarrhea, nausea and vomiting.   Endocrine: Negative for cold intolerance and heat intolerance.   Genitourinary: Positive for menstrual problem (menorrhagia; dysmenorrhea). Negative for decreased libido, decreased urine volume, difficulty urinating, dyspareunia, dysuria, flank pain, frequency, genital sores, hematuria, pelvic pain, pelvic pressure, urgency, urinary incontinence, vaginal bleeding, vaginal discharge and vaginal pain.   Musculoskeletal: Negative for gait problem.   Skin: Negative for pallor, rash and skin lesions.   Allergic/Immunologic: Negative for environmental allergies, food allergies and immunocompromised state.   Neurological: Negative for dizziness, light-headedness and headache.   Hematological: Negative for adenopathy. Does not bruise/bleed easily.   Psychiatric/Behavioral: Negative for  dysphoric mood, self-injury, suicidal ideas and depressed mood. The patient is not nervous/anxious.        Objective   Physical Exam      TVUS today: Anteverted uterus, 7.33 cm in length. Endometrial thickness 13.4 mm. Normal appearing ovaries. Small follicles bilaterally. Free fluid in posterior cul de sac noted.     CT of Pelvis 07/22/2022): Small amount of free fluid is noted within the cul-de-sac felt to be physiologic. Small cysts or dominant follicles are noted of each ovary.     Chart reviewed for screenings  Last Pap Smear: 07/21/2022 NILM   Last Mammogram: Not yet indicated. Denies family history of breast cancer.   Last Colonoscopy: Not yet indicated. Denies family history of colon cancer.   Last Bone Density Scan: Not yet indicated    Assessment & Plan   Diagnoses and all orders for this visit:    1. Menorrhagia with regular cycle (Primary)  - TVUS today and reviewed. Patient is presently on metformin for weight. She had a history of irregular menstrual cycles, which improved to being regular. Encouraged to continue with management plan with the metformin. Patient declines plan of hormone therapy for support for menorrhagia, as she is trying to conceive. Encouraged to continue with working to achieve healthier BMI.   - Return in three months for re-evaluation of menorrhagia and treatment options and as needed with any concerns.    2. Dysmenorrhea  - Has received education for measures of support, including heat and NSAIDs.    3. Pre-conception counseling  - Reports they will have been trying to conceive for a year after September of this year. Discussed tracking her menstrual cycles and patient may also choose to utilize ovulation test kits.   - Continue with prenatal vitamin daily.   - Also discussed with couple of having her  see his PCP for a physical and also discussed fertility testing with a sperm analysis kit. Patient and her  provided with the collection kit. Reinforced to refrain  from intercourse for 3 days prior to specimen collections.   - Preparing for Pregnancy and Ovulation Tests for Fertility videos included in the AVS. Preparing for pregnancy education included in the AVS.    4. Non-smoker         BMI is >= 30 and <35. (Class 1 Obesity). The following options were offered after discussion;: exercise counseling/recommendations and nutrition counseling/recommendations. Patient is taking Metformin for weight management support through her PCP.       This note has been signed electronically.    Johana Mahoney, DNP, APRN, CNM, RNC-OB  8/25/2022

## 2022-08-31 RX ORDER — BUPROPION HYDROCHLORIDE 150 MG/1
150 TABLET ORAL DAILY
Qty: 90 TABLET | Refills: 3 | Status: SHIPPED | OUTPATIENT
Start: 2022-08-31 | End: 2022-12-09

## 2022-09-01 ENCOUNTER — OFFICE VISIT (OUTPATIENT)
Dept: INTERNAL MEDICINE | Facility: CLINIC | Age: 27
End: 2022-09-01

## 2022-09-01 VITALS
HEART RATE: 72 BPM | SYSTOLIC BLOOD PRESSURE: 112 MMHG | HEIGHT: 63 IN | OXYGEN SATURATION: 98 % | BODY MASS INDEX: 30.44 KG/M2 | WEIGHT: 171.8 LBS | DIASTOLIC BLOOD PRESSURE: 66 MMHG | TEMPERATURE: 96.9 F

## 2022-09-01 DIAGNOSIS — R10.9 STOMACH PAIN: Primary | ICD-10-CM

## 2022-09-01 DIAGNOSIS — R53.83 FATIGUE, UNSPECIFIED TYPE: ICD-10-CM

## 2022-09-01 DIAGNOSIS — G89.29 CHRONIC LEFT SI JOINT PAIN: Primary | ICD-10-CM

## 2022-09-01 DIAGNOSIS — M53.3 CHRONIC LEFT SI JOINT PAIN: Primary | ICD-10-CM

## 2022-09-01 DIAGNOSIS — R22.31 LOCALIZED SWELLING OF RIGHT THUMB: ICD-10-CM

## 2022-09-01 DIAGNOSIS — M79.644 PAIN OF RIGHT THUMB: ICD-10-CM

## 2022-09-01 PROCEDURE — 99213 OFFICE O/P EST LOW 20 MIN: CPT

## 2022-09-01 RX ORDER — SUCRALFATE 1 G/1
1 TABLET ORAL 4 TIMES DAILY
Qty: 56 TABLET | Refills: 0 | Status: SHIPPED | OUTPATIENT
Start: 2022-09-01 | End: 2022-09-15

## 2022-09-01 NOTE — PROGRESS NOTES
Subjective   Adelaida Blair is a 26 y.o. female.   Chief Complaint   Patient presents with   • Hand Pain     Both, mainly right        History of Present Illness   Yumi is here today with complaints of right hand pain.  She denies any numbness or tingling or feelings of pins-and-needles in her right hand or right thumb, has not felt that her hands any weaker however certain movements such as pumping a blood pressure cuff increases the severity of the pain which makes these test difficult.  She did notice some redness and warmth in the plantar area of the right thumb, did not note swelling until mention today.  She does take NSAIDs fairly regularly related to painful left SI joint which she has had for years as well as painful menstrual cycles.  She reports that the pain in her right hand has been present for about a month now.  She has utilized NSAID therapy, topical Voltaren gel, and wrist brace all with no benefit.  She also has a history of ongoing fatigue, reports that if she gets 10 hours of sleep she feels okay however if she gets less than that she feels tired all the time.  She does work at a job that requires a lot of typing and utilization of her right hand with wrist motion.  Denies any family history of RA, or other inflammatory arthritis  The following portions of the patient's history were reviewed and updated as appropriate: allergies, current medications, past family history, past medical history, past social history, past surgical history and problem list.    Review of Systems    Objective   Past Medical History:   Diagnosis Date   • GERD (gastroesophageal reflux disease)       Past Surgical History:   Procedure Laterality Date   • HEAD/NECK LESION/CYST EXCISION Left 11/12/2020    Procedure: Excision of lesion of the left jawline with linear closure;  Surgeon: David Aleman MD;  Location: [facility];  Service: ENT;  Laterality: Left;   • KIDNEY SURGERY Left     partial   • MYRINGOTOMY W/ TUBES    "       Current Outpatient Medications:   •  buPROPion XL (Wellbutrin XL) 150 MG 24 hr tablet, Take 1 tablet by mouth Daily., Disp: 90 tablet, Rfl: 3  •  cetirizine (zyrTEC) 10 MG tablet, Take 1 tablet by mouth Daily., Disp: 30 tablet, Rfl: 2  •  Cholecalciferol (Vitamin D) 50 MCG (2000 UT) tablet, Take 2,000 Units by mouth Daily., Disp: , Rfl:   •  diclofenac (Cataflam) 50 MG tablet, Take 1 tablet by mouth 3 (Three) Times a Day As Needed (menstural cramps)., Disp: 30 tablet, Rfl: 3  •  Diclofenac Sodium (Voltaren) 1 % gel gel, Apply 4 g topically to the appropriate area as directed 4 (Four) Times a Day As Needed (joint pain)., Disp: 300 g, Rfl: 2  •  fluticasone (Flonase) 50 MCG/ACT nasal spray, 2 sprays into the nostril(s) as directed by provider Daily., Disp: 16 g, Rfl: 11  •  metFORMIN (Glucophage) 500 MG tablet, Take 1 tablet by mouth Daily With Breakfast., Disp: 30 tablet, Rfl: 2  •  montelukast (Singulair) 10 MG tablet, Take 1 tablet by mouth Every Night., Disp: 30 tablet, Rfl: 3  •  norgestimate-ethinyl estradiol (ORTHO-CYCLEN) 0.25-35 MG-MCG per tablet, Take 1 tablet by mouth Daily., Disp: 28 tablet, Rfl: 12  •  omeprazole (priLOSEC) 40 MG capsule, Take 1 capsule by mouth Daily., Disp: 90 capsule, Rfl: 3  •  ondansetron ODT (Zofran ODT) 4 MG disintegrating tablet, Place 1 tablet on the tongue Every 8 (Eight) Hours As Needed for Nausea or Vomiting., Disp: 10 tablet, Rfl: 0  •  Prenatal Vit-Fe Fumarate-FA (PRENATAL VITAMIN AND MINERAL PO), Take  by mouth., Disp: , Rfl:   •  sucralfate (Carafate) 1 g tablet, Take 1 tablet by mouth 4 (Four) Times a Day for 14 days., Disp: 56 tablet, Rfl: 0  •  triamcinolone (KENALOG) 0.1 % paste, Apply  to teeth 2 (Two) Times a Day., Disp: 5 g, Rfl: 12      /66 (BP Location: Left arm, Patient Position: Sitting, Cuff Size: Adult)   Pulse 72   Temp 96.9 °F (36.1 °C) (Temporal)   Ht 160 cm (63\")   Wt 77.9 kg (171 lb 12.8 oz)   LMP 08/30/2022 (Exact Date)   SpO2 98%   " Breastfeeding No   BMI 30.43 kg/m²      Body mass index is 30.43 kg/m².      Physical Exam  Vitals and nursing note reviewed.   Constitutional:       Appearance: Normal appearance.   HENT:      Head: Normocephalic and atraumatic.   Eyes:      Pupils: Pupils are equal, round, and reactive to light.   Musculoskeletal:         General: Swelling (Right thumb, plantar surface) and tenderness (Left SI joint and right thumb) present.      Right hand: Swelling and tenderness present. Normal range of motion. Normal strength. Normal capillary refill. Normal pulse.      Right lower leg: No edema.      Left lower leg: No edema.   Skin:     General: Skin is warm and dry.      Capillary Refill: Capillary refill takes less than 2 seconds.      Findings: Erythema (Slight, noted plantar surface of her thumb) present.   Neurological:      General: No focal deficit present.      Mental Status: She is alert and oriented to person, place, and time. Mental status is at baseline.   Psychiatric:         Mood and Affect: Mood normal.         Behavior: Behavior normal.         Thought Content: Thought content normal.         Judgment: Judgment normal.               Assessment & Plan   Diagnoses and all orders for this visit:    1. Chronic left SI joint pain (Primary)  -     CADEN Direct Reflex to 11 Biomarker  -     C-reactive protein  -     Sedimentation rate  -     Rheumatoid Factor, Quant    2. Localized swelling of right thumb  -     CADEN Direct Reflex to 11 Biomarker  -     C-reactive protein  -     Sedimentation rate  -     Rheumatoid Factor, Quant    3. Pain of right thumb  -     CADEN Direct Reflex to 11 Biomarker  -     C-reactive protein  -     Sedimentation rate  -     Rheumatoid Factor, Quant    4. Fatigue, unspecified type  -     CADEN Direct Reflex to 11 Biomarker  -     C-reactive protein  -     Sedimentation rate  -     Rheumatoid Factor, Quant               Plan of care reviewed with Adelaida  At this time I think that we will hold  off on EMG study as she does not present with any kind of numbness or tingling, shooting or radiating pain, she had a negative Phalen's test here in office. Would like to proceed with assessing inflammatory markers given her history of arthritic pain and SI joint that is been present for several years and now also pain and inflammation present in right hand.  Advised that if testing is unrevealing we may consider EMG study and or orthopedic referral.  At this time continue with current therapies we will communicate results as available.  Keep next scheduled visit.    This note was created using the [employer] Dragon Device.

## 2022-09-02 LAB
ANA SER QL: NEGATIVE
CRP SERPL-MCNC: 0.64 MG/DL (ref 0–0.5)
ERYTHROCYTE [SEDIMENTATION RATE] IN BLOOD BY WESTERGREN METHOD: 4 MM/HR (ref 0–20)
RHEUMATOID FACT SERPL-ACNC: <10 IU/ML

## 2022-09-12 DIAGNOSIS — M25.552 LEFT HIP PAIN: Primary | ICD-10-CM

## 2022-09-12 RX ORDER — METHYLPREDNISOLONE 4 MG/1
TABLET ORAL
Qty: 21 TABLET | Refills: 1 | Status: SHIPPED | OUTPATIENT
Start: 2022-09-12 | End: 2022-10-13

## 2022-09-14 ENCOUNTER — CLINICAL SUPPORT (OUTPATIENT)
Dept: INTERNAL MEDICINE | Facility: CLINIC | Age: 27
End: 2022-09-14

## 2022-09-14 DIAGNOSIS — T14.8XXA MUSCLE STRAIN: ICD-10-CM

## 2022-09-14 DIAGNOSIS — T14.8XXA MUSCLE STRAIN: Primary | ICD-10-CM

## 2022-09-14 PROCEDURE — 96372 THER/PROPH/DIAG INJ SC/IM: CPT | Performed by: NURSE PRACTITIONER

## 2022-09-14 RX ORDER — KETOROLAC TROMETHAMINE 30 MG/ML
30 INJECTION, SOLUTION INTRAMUSCULAR; INTRAVENOUS ONCE
Status: COMPLETED | OUTPATIENT
Start: 2022-09-14 | End: 2022-09-14

## 2022-09-14 RX ORDER — TRIAMCINOLONE ACETONIDE 40 MG/ML
40 INJECTION, SUSPENSION INTRA-ARTICULAR; INTRAMUSCULAR ONCE
Status: COMPLETED | OUTPATIENT
Start: 2022-09-14 | End: 2022-09-14

## 2022-09-14 RX ADMIN — TRIAMCINOLONE ACETONIDE 40 MG: 40 INJECTION, SUSPENSION INTRA-ARTICULAR; INTRAMUSCULAR at 10:00

## 2022-09-14 RX ADMIN — KETOROLAC TROMETHAMINE 30 MG: 30 INJECTION, SOLUTION INTRAMUSCULAR; INTRAVENOUS at 09:59

## 2022-09-16 RX ORDER — HYOSCYAMINE SULFATE 0.125 MG
0.12 TABLET ORAL EVERY 4 HOURS PRN
Qty: 120 TABLET | Refills: 1 | Status: SHIPPED | OUTPATIENT
Start: 2022-09-16 | End: 2022-09-16

## 2022-09-16 RX ORDER — HYOSCYAMINE SULFATE 0.125 MG
0.12 TABLET ORAL EVERY 4 HOURS PRN
Qty: 120 TABLET | Refills: 1 | Status: SHIPPED | OUTPATIENT
Start: 2022-09-16

## 2022-09-22 RX ORDER — CETIRIZINE HYDROCHLORIDE 10 MG/1
10 TABLET ORAL DAILY
Qty: 30 TABLET | Refills: 2 | Status: SHIPPED | OUTPATIENT
Start: 2022-09-22

## 2022-10-11 ENCOUNTER — APPOINTMENT (OUTPATIENT)
Dept: GENERAL RADIOLOGY | Facility: HOSPITAL | Age: 27
End: 2022-10-11

## 2022-10-11 ENCOUNTER — HOSPITAL ENCOUNTER (EMERGENCY)
Facility: HOSPITAL | Age: 27
Discharge: HOME | End: 2022-10-11
Admitting: STUDENT IN AN ORGANIZED HEALTH CARE EDUCATION/TRAINING PROGRAM

## 2022-10-11 VITALS
DIASTOLIC BLOOD PRESSURE: 69 MMHG | HEART RATE: 93 BPM | BODY MASS INDEX: 27.32 KG/M2 | SYSTOLIC BLOOD PRESSURE: 116 MMHG | HEIGHT: 66 IN | RESPIRATION RATE: 18 BRPM | TEMPERATURE: 98.8 F | WEIGHT: 170 LBS | OXYGEN SATURATION: 98 %

## 2022-10-11 DIAGNOSIS — S91.312A LACERATION OF LEFT FOOT, INITIAL ENCOUNTER: Primary | ICD-10-CM

## 2022-10-11 PROCEDURE — 73630 X-RAY EXAM OF FOOT: CPT

## 2022-10-11 PROCEDURE — 90471 IMMUNIZATION ADMIN: CPT | Performed by: NURSE PRACTITIONER

## 2022-10-11 PROCEDURE — 25010000002 TETANUS-DIPHTH-ACELL PERTUSSIS 5-2.5-18.5 LF-MCG/0.5 SUSPENSION PREFILLED SYRINGE: Performed by: NURSE PRACTITIONER

## 2022-10-11 PROCEDURE — 99283 EMERGENCY DEPT VISIT LOW MDM: CPT

## 2022-10-11 PROCEDURE — 90715 TDAP VACCINE 7 YRS/> IM: CPT | Performed by: NURSE PRACTITIONER

## 2022-10-11 PROCEDURE — 0 LIDOCAINE 1 % SOLUTION: Performed by: NURSE PRACTITIONER

## 2022-10-11 RX ORDER — LIDOCAINE HYDROCHLORIDE 10 MG/ML
10 INJECTION, SOLUTION INFILTRATION; PERINEURAL ONCE
Status: COMPLETED | OUTPATIENT
Start: 2022-10-11 | End: 2022-10-11

## 2022-10-11 RX ADMIN — TETANUS TOXOID, REDUCED DIPHTHERIA TOXOID AND ACELLULAR PERTUSSIS VACCINE, ADSORBED 0.5 ML: 5; 2.5; 8; 8; 2.5 SUSPENSION INTRAMUSCULAR at 21:25

## 2022-10-11 RX ADMIN — LIDOCAINE HYDROCHLORIDE 10 ML: 10 INJECTION, SOLUTION INFILTRATION; PERINEURAL at 21:05

## 2022-10-12 NOTE — ED PROVIDER NOTES
Subjective   History of Present Illness  Patient is a pleasant 26-year-old female presents ER today per EMS with complaint left foot pain.  Patient reports that this evening she was cooking some ribs and a glass container.  She took the container out of the oven and it exploded shattering on her left foot.  The patient states this caused a laceration to the top of her left foot.  She still feels like there may be glass in there.  She does not know when her last tetanus vaccine was.  She presents here today for further evaluation    History provided by:  Patient   used: No        Review of Systems   Skin: Positive for wound.   All other systems reviewed and are negative.      Past Medical History:   Diagnosis Date   • GERD (gastroesophageal reflux disease)        No Known Allergies    Past Surgical History:   Procedure Laterality Date   • HEAD/NECK LESION/CYST EXCISION Left 11/12/2020    Procedure: Excision of lesion of the left jawline with linear closure;  Surgeon: David Aleman MD;  Location: [facility];  Service: ENT;  Laterality: Left;   • KIDNEY SURGERY Left     partial   • MYRINGOTOMY W/ TUBES         Family History   Problem Relation Age of Onset   • Diabetes Father    • Hyperlipidemia Father    • Skin cancer Father        Social History     Socioeconomic History   • Marital status:    Tobacco Use   • Smoking status: Never   • Smokeless tobacco: Never   Vaping Use   • Vaping Use: Never used   Substance and Sexual Activity   • Alcohol use: Yes     Comment: occ   • Drug use: Never   • Sexual activity: Yes     Partners: Male     Birth control/protection: None           Objective   Physical Exam  Vitals and nursing note reviewed.   Constitutional:       Appearance: Normal appearance.   HENT:      Head: Normocephalic and atraumatic.      Mouth/Throat:      Mouth: Mucous membranes are moist.      Pharynx: Oropharynx is clear.   Eyes:      Conjunctiva/sclera: Conjunctivae normal.    Cardiovascular:      Rate and Rhythm: Normal rate and regular rhythm.   Pulmonary:      Effort: Pulmonary effort is normal.      Breath sounds: Normal breath sounds.   Skin:     General: Skin is warm and dry.      Capillary Refill: Capillary refill takes less than 2 seconds.      Comments: 4 cm laceration to dorsal aspect lt foot, bleeding controlled   Neurological:      General: No focal deficit present.      Mental Status: She is alert and oriented to person, place, and time.   Psychiatric:         Mood and Affect: Mood normal.         Behavior: Behavior normal.         Laceration Repair    Date/Time: 10/11/2022 10:56 PM  Performed by: Padmini Barakat APRN  Authorized by: Padmini Barakat APRN     Consent:     Consent obtained:  Verbal    Consent given by:  Patient    Risks, benefits, and alternatives were discussed: yes      Risks discussed:  Infection, need for additional repair, nerve damage, pain, poor cosmetic result, poor wound healing, retained foreign body, tendon damage and vascular damage    Alternatives discussed:  No treatment, delayed treatment, observation and referral  Universal protocol:     Procedure explained and questions answered to patient or proxy's satisfaction: yes      Relevant documents present and verified: yes      Test results available: yes      Imaging studies available: yes      Site/side marked: yes      Immediately prior to procedure, a time out was called: yes      Patient identity confirmed:  Verbally with patient and arm band  Anesthesia:     Anesthesia method:  Local infiltration    Local anesthetic:  Lidocaine 1% w/o epi  Laceration details:     Location:  Foot    Foot location:  Top of L foot    Length (cm):  4  Pre-procedure details:     Preparation:  Patient was prepped and draped in usual sterile fashion and imaging obtained to evaluate for foreign bodies  Exploration:     Limited defect created (wound extended): no      Hemostasis achieved with:  Direct  pressure    Wound exploration: wound explored through full range of motion and entire depth of wound visualized      Wound extent: no areolar tissue violation noted, no fascia violation noted, no foreign bodies/material noted, no muscle damage noted, no nerve damage noted, no tendon damage noted, no underlying fracture noted and no vascular damage noted      Contaminated: no    Treatment:     Area cleansed with:  Chlorhexidine and saline    Amount of cleaning:  Extensive    Irrigation solution:  Sterile saline    Irrigation volume:  1 L NS    Irrigation method:  Syringe    Debridement:  None    Undermining:  None    Scar revision: no    Skin repair:     Repair method:  Sutures    Suture size:  5-0    Suture material:  Nylon    Suture technique:  Simple interrupted    Number of sutures:  6  Approximation:     Approximation:  Loose  Repair type:     Repair type:  Simple  Post-procedure details:     Dressing:  Non-adherent dressing and splint for protection    Procedure completion:  Tolerated well, no immediate complications               ED Course  ED Course as of 10/11/22 2257   Tue Oct 11, 2022   2257 Laceration repair performed.  Please see procedure note.  Patient be discharged home this time in stable condition.  Will place in a postop shoe.  Advised to follow-up and have sutures removed in 12 days, return to the ER for any new or worsening symptoms. [LF]      ED Course User Index  [LF] Padmini Barakat, APRN                                 XR Foot 3+ View Left   Final Result       No acute osseous findings.   This report was finalized on 10/11/2022 20:56 by Dr. Mason Nicole MD.        Labs Reviewed - No data to display            MDM  Number of Diagnoses or Management Options  Laceration of left foot, initial encounter: new and requires workup     Amount and/or Complexity of Data Reviewed  Tests in the radiology section of CPT®: ordered and reviewed    Patient Progress  Patient progress: stable      Final  diagnoses:   Laceration of left foot, initial encounter       ED Disposition  ED Disposition     ED Disposition   Discharge    Condition   Stable    Comment   --             Jessica Smiley, APRN  [address] [place] [address] [phone]    Call in 1 day           Medication List      No changes were made to your prescriptions during this visit.          Padmini Barakat, APRN  10/11/22 5847

## 2022-10-12 NOTE — DISCHARGE INSTRUCTIONS
Please keep wound clean and dry.  Please have sutures removed in 10 to 12 days.  Return to the ER for any new or worsening symptoms.

## 2022-10-13 ENCOUNTER — OFFICE VISIT (OUTPATIENT)
Dept: INTERNAL MEDICINE | Facility: CLINIC | Age: 27
End: 2022-10-13

## 2022-10-13 VITALS
BODY MASS INDEX: 30.12 KG/M2 | TEMPERATURE: 96.9 F | DIASTOLIC BLOOD PRESSURE: 60 MMHG | HEIGHT: 63 IN | HEART RATE: 81 BPM | SYSTOLIC BLOOD PRESSURE: 110 MMHG | OXYGEN SATURATION: 100 % | WEIGHT: 170 LBS

## 2022-10-13 DIAGNOSIS — S91.312S FOOT LACERATION, LEFT, SEQUELA: Primary | ICD-10-CM

## 2022-10-13 PROCEDURE — 99213 OFFICE O/P EST LOW 20 MIN: CPT

## 2022-10-13 NOTE — PROGRESS NOTES
Subjective   Adelaida Blair is a 26 y.o. female.   Chief Complaint   Patient presents with   • Follow-up     Pt seen in ER on 10/11/22 for laceration on left foot        History of Present Illness   Ms. Blair is here today for ER follow up.   Went to the ER on October 11 via EMS after while she was cooking ribs and a glass container it exploded while she was taking the foil off, she sustained a laceration to her left foot with bleeding afterwards.  She was given a tetanus vaccine.  Laceration was repaired with 6 nylon sutures.  It was dressed with a nonadherent dressing and advised to splint for protection.  Sutures are to be removed in 12 days, this would be the 23rd.  She reports that she does not currently have SEMM pain, pain is triggered with ambulation. States she feels increased pressure like the sutures may tear with extensive ambulation or weight bearing. Boot strap caused increased pressure and pain so she has not been wearing is wear crocs as they are the most comfortable.   She states that there have been no signs of infection. She feels comfortable working in a sedentary role.     The following portions of the patient's history were reviewed and updated as appropriate: allergies, current medications, past family history, past medical history, past social history, past surgical history and problem list.    Review of Systems    Objective   Past Medical History:   Diagnosis Date   • GERD (gastroesophageal reflux disease)       Past Surgical History:   Procedure Laterality Date   • HEAD/NECK LESION/CYST EXCISION Left 11/12/2020    Procedure: Excision of lesion of the left jawline with linear closure;  Surgeon: David Aleman MD;  Location: [facility];  Service: ENT;  Laterality: Left;   • KIDNEY SURGERY Left     partial   • MYRINGOTOMY W/ TUBES          Current Outpatient Medications:   •  buPROPion XL (Wellbutrin XL) 150 MG 24 hr tablet, Take 1 tablet by mouth Daily., Disp: 90 tablet, Rfl: 3  •   "cetirizine (zyrTEC) 10 MG tablet, Take 1 tablet by mouth Daily., Disp: 30 tablet, Rfl: 2  •  Cholecalciferol (Vitamin D) 50 MCG (2000 UT) tablet, Take 2,000 Units by mouth Daily., Disp: , Rfl:   •  diclofenac (Cataflam) 50 MG tablet, Take 1 tablet by mouth 3 (Three) Times a Day As Needed (menstural cramps)., Disp: 30 tablet, Rfl: 3  •  Diclofenac Sodium (Voltaren) 1 % gel gel, Apply 4 g topically to the appropriate area as directed 4 (Four) Times a Day As Needed (joint pain)., Disp: 300 g, Rfl: 2  •  fluticasone (Flonase) 50 MCG/ACT nasal spray, 2 sprays into the nostril(s) as directed by provider Daily., Disp: 16 g, Rfl: 11  •  hyoscyamine (Levsin) 0.125 MG tablet, Take 1 tablet by mouth Every 4 (Four) Hours As Needed for Cramping., Disp: 120 tablet, Rfl: 1  •  metFORMIN (Glucophage) 500 MG tablet, Take 1 tablet by mouth Daily With Breakfast., Disp: 30 tablet, Rfl: 2  •  montelukast (Singulair) 10 MG tablet, Take 1 tablet by mouth Every Night., Disp: 30 tablet, Rfl: 3  •  omeprazole (priLOSEC) 40 MG capsule, Take 1 capsule by mouth Daily., Disp: 90 capsule, Rfl: 3  •  ondansetron ODT (Zofran ODT) 4 MG disintegrating tablet, Place 1 tablet on the tongue Every 8 (Eight) Hours As Needed for Nausea or Vomiting., Disp: 10 tablet, Rfl: 0  •  Prenatal Vit-Fe Fumarate-FA (PRENATAL VITAMIN AND MINERAL PO), Take  by mouth., Disp: , Rfl:   •  triamcinolone (KENALOG) 0.1 % paste, Apply  to teeth 2 (Two) Times a Day., Disp: 5 g, Rfl: 12      /60 (BP Location: Left arm, Patient Position: Sitting, Cuff Size: Adult)   Pulse 81   Temp 96.9 °F (36.1 °C) (Temporal)   Ht 160 cm (63\")   Wt 77.1 kg (170 lb)   LMP 09/30/2022 (Exact Date)   SpO2 100%   BMI 30.11 kg/m²      Body mass index is 30.11 kg/m².         Physical Exam  Vitals and nursing note reviewed.   Constitutional:       Appearance: Normal appearance.   HENT:      Head: Normocephalic and atraumatic.   Eyes:      Extraocular Movements: Extraocular movements " intact.      Conjunctiva/sclera: Conjunctivae normal.      Pupils: Pupils are equal, round, and reactive to light.   Cardiovascular:      Rate and Rhythm: Normal rate and regular rhythm.      Pulses: Normal pulses.      Heart sounds: Normal heart sounds.   Pulmonary:      Effort: Pulmonary effort is normal.      Breath sounds: Normal breath sounds.   Abdominal:      General: Bowel sounds are normal.      Palpations: Abdomen is soft.   Musculoskeletal:         General: Normal range of motion.      Cervical back: Normal range of motion and neck supple.      Right lower leg: No edema.      Left lower leg: No edema.   Skin:     General: Skin is warm and dry.      Capillary Refill: Capillary refill takes less than 2 seconds.             Comments: Laceration present with 6 sutures, C/D/I   Slight edema to toes, PPP. Cap refill <2 seconds    Neurological:      General: No focal deficit present.      Mental Status: She is alert and oriented to person, place, and time. Mental status is at baseline.   Psychiatric:         Mood and Affect: Mood normal.         Behavior: Behavior normal.         Thought Content: Thought content normal.         Judgment: Judgment normal.               Assessment & Plan   Diagnoses and all orders for this visit:    1. Foot laceration, left, sequela (Primary)               Plan of care reviewed with Ms. Blair   Needs activity restriction involving walking less than 20 feet at any specific time, may do activities that require sitting. Is not to lift more that 25 lb to avoid putting extra weight on foot laceration until 10/21 at which time we will remove stitches and re-eval.   Continue with washing daily with antimicrobial soap daily, apply abx ointment, notify of any s/s of infection. Prop foot up as able for swelling, prn ibuprofen/tylenol for pain. May be seen as needed before the 21st.

## 2022-10-19 RX ORDER — OMEPRAZOLE 40 MG/1
40 CAPSULE, DELAYED RELEASE ORAL DAILY
Qty: 90 CAPSULE | Refills: 3 | Status: SHIPPED | OUTPATIENT
Start: 2022-10-19

## 2022-10-21 ENCOUNTER — OFFICE VISIT (OUTPATIENT)
Dept: INTERNAL MEDICINE | Facility: CLINIC | Age: 27
End: 2022-10-21

## 2022-10-21 VITALS
BODY MASS INDEX: 30.12 KG/M2 | TEMPERATURE: 98.4 F | HEART RATE: 76 BPM | WEIGHT: 170 LBS | SYSTOLIC BLOOD PRESSURE: 104 MMHG | OXYGEN SATURATION: 99 % | HEIGHT: 63 IN | DIASTOLIC BLOOD PRESSURE: 64 MMHG

## 2022-10-21 DIAGNOSIS — S91.312S FOOT LACERATION, LEFT, SEQUELA: Primary | ICD-10-CM

## 2022-10-21 PROCEDURE — 99212 OFFICE O/P EST SF 10 MIN: CPT

## 2022-10-21 NOTE — PROGRESS NOTES
Subjective   Adelaida Blair is a 26 y.o. female.   Chief Complaint   Patient presents with   • Laceration     Left foot laceration  6 sutures in place  Pn level decreased         History of Present Illness   Ms. Blair presents here today for a 1 week follow-up on left foot laceration  She had 3 sutures removed earlier this week, 3 sutures remain.  She reports that pain has improved, has not made any significant increases in activity level since her visit with me.  Does feel that she still has some swelling and edema, got slightly worse on Wednesday.  Has been wearing loose fitting shoes like crocs and slides.    The following portions of the patient's history were reviewed and updated as appropriate: allergies, current medications, past family history, past medical history, past social history, past surgical history and problem list.    Review of Systems    Objective   Past Medical History:   Diagnosis Date   • GERD (gastroesophageal reflux disease)       Past Surgical History:   Procedure Laterality Date   • HEAD/NECK LESION/CYST EXCISION Left 11/12/2020    Procedure: Excision of lesion of the left jawline with linear closure;  Surgeon: David Aleman MD;  Location: [facility];  Service: ENT;  Laterality: Left;   • KIDNEY SURGERY Left     partial   • MYRINGOTOMY W/ TUBES          Current Outpatient Medications:   •  buPROPion XL (Wellbutrin XL) 150 MG 24 hr tablet, Take 1 tablet by mouth Daily., Disp: 90 tablet, Rfl: 3  •  cetirizine (zyrTEC) 10 MG tablet, Take 1 tablet by mouth Daily., Disp: 30 tablet, Rfl: 2  •  Cholecalciferol (Vitamin D) 50 MCG (2000 UT) tablet, Take 2,000 Units by mouth Daily., Disp: , Rfl:   •  diclofenac (Cataflam) 50 MG tablet, Take 1 tablet by mouth 3 (Three) Times a Day As Needed (menstural cramps)., Disp: 30 tablet, Rfl: 3  •  Diclofenac Sodium (Voltaren) 1 % gel gel, Apply 4 g topically to the appropriate area as directed 4 (Four) Times a Day As Needed (joint pain)., Disp: 300 g, Rfl:  "2  •  fluticasone (Flonase) 50 MCG/ACT nasal spray, 2 sprays into the nostril(s) as directed by provider Daily., Disp: 16 g, Rfl: 11  •  hyoscyamine (Levsin) 0.125 MG tablet, Take 1 tablet by mouth Every 4 (Four) Hours As Needed for Cramping., Disp: 120 tablet, Rfl: 1  •  metFORMIN (Glucophage) 500 MG tablet, Take 1 tablet by mouth Daily With Breakfast., Disp: 30 tablet, Rfl: 2  •  montelukast (Singulair) 10 MG tablet, Take 1 tablet by mouth Every Night., Disp: 30 tablet, Rfl: 3  •  omeprazole (priLOSEC) 40 MG capsule, Take 1 capsule by mouth Daily., Disp: 90 capsule, Rfl: 3  •  ondansetron ODT (Zofran ODT) 4 MG disintegrating tablet, Place 1 tablet on the tongue Every 8 (Eight) Hours As Needed for Nausea or Vomiting., Disp: 10 tablet, Rfl: 0  •  Prenatal Vit-Fe Fumarate-FA (PRENATAL VITAMIN AND MINERAL PO), Take  by mouth., Disp: , Rfl:   •  triamcinolone (KENALOG) 0.1 % paste, Apply  to teeth 2 (Two) Times a Day., Disp: 5 g, Rfl: 12      /64 (BP Location: Right arm, Patient Position: Sitting, Cuff Size: Adult)   Pulse 76   Temp 98.4 °F (36.9 °C)   Ht 160 cm (63\")   Wt 77.1 kg (170 lb)   LMP 09/30/2022 (Exact Date)   SpO2 99%   BMI 30.11 kg/m²      Body mass index is 30.11 kg/m².         Physical Exam  Constitutional:       Appearance: Normal appearance.   Eyes:      Pupils: Pupils are equal, round, and reactive to light.   Musculoskeletal:        Feet:    Feet:      Comments: Skin laceration has already had 3 sutures removed, there is oozing of blood from the site, wound edges have not healed together, I do feel like it is too early to remove the rest of the sutures for risk of dehiscence, will remain in place and reevaluate early Monday morning  Skin:     General: Skin is warm and dry.      Capillary Refill: Capillary refill takes less than 2 seconds.   Neurological:      General: No focal deficit present.      Mental Status: She is alert and oriented to person, place, and time. Mental status is at " baseline.   Psychiatric:         Mood and Affect: Mood normal.         Behavior: Behavior normal.         Thought Content: Thought content normal.         Judgment: Judgment normal.         Suture Removal    Date/Time: 10/24/2022 7:12 AM  Performed by: Jessica Smiley APRN  Authorized by: Jessica Smiley APRN   Body area: lower extremity  Location details: left foot  Wound Appearance: clean and pink  Sutures Removed: 3  Post-removal: dressing applied and Steri-Strips applied  Patient tolerance: patient tolerated the procedure well with no immediate complications          Assessment & Plan   Diagnoses and all orders for this visit:    1. Foot laceration, left, sequela (Primary)                Plan of care discussed with Ms. Blair   skin laceration has already had 3 sutures removed, there is oozing of blood from the site, wound edges have not healed together, I do feel like it is too early to remove the rest of the sutures for risk of dehiscence, will remain in place and reevaluate early Monday morning around 0700.   There is slight erythema around the sutures are still in place, no signs or symptoms of infection, there is generalized bruising dependent to the laceration  She was encouraged to try supportive shoes over the weekend and to try increasing ambulation walking and performing normal daily activities to assess tolerance.        Addendum 10/24/22  Reevaluation this morning, 3 remaining sutures removed without any complication, appears to be healing well,  relieved from any work restrictions, may return to duties in full capacity.

## 2022-11-16 RX ORDER — MONTELUKAST SODIUM 10 MG/1
10 TABLET ORAL NIGHTLY
Qty: 30 TABLET | Refills: 3 | Status: SHIPPED | OUTPATIENT
Start: 2022-11-16 | End: 2023-03-28 | Stop reason: SDUPTHER

## 2022-11-16 RX ORDER — METHYLPREDNISOLONE 4 MG/1
TABLET ORAL
Qty: 21 TABLET | Refills: 0 | Status: SHIPPED | OUTPATIENT
Start: 2022-11-16 | End: 2022-12-09

## 2022-11-17 DIAGNOSIS — R09.81 SINUS CONGESTION: Primary | ICD-10-CM

## 2022-11-17 RX ORDER — AZELASTINE 1 MG/ML
2 SPRAY, METERED NASAL 2 TIMES DAILY
Qty: 1 EACH | Refills: 12 | Status: SHIPPED | OUTPATIENT
Start: 2022-11-17

## 2022-11-17 RX ORDER — FLUTICASONE PROPIONATE 50 MCG
2 SPRAY, SUSPENSION (ML) NASAL DAILY
Qty: 1 G | Refills: 3 | Status: SHIPPED | OUTPATIENT
Start: 2022-11-17

## 2022-12-02 ENCOUNTER — TELEPHONE (OUTPATIENT)
Dept: INTERNAL MEDICINE | Facility: CLINIC | Age: 27
End: 2022-12-02

## 2022-12-09 ENCOUNTER — OFFICE VISIT (OUTPATIENT)
Dept: INTERNAL MEDICINE | Facility: CLINIC | Age: 27
End: 2022-12-09

## 2022-12-09 VITALS
WEIGHT: 174.6 LBS | HEIGHT: 63 IN | TEMPERATURE: 97.1 F | BODY MASS INDEX: 30.94 KG/M2 | HEART RATE: 91 BPM | OXYGEN SATURATION: 99 %

## 2022-12-09 DIAGNOSIS — E55.9 VITAMIN D DEFICIENCY: ICD-10-CM

## 2022-12-09 DIAGNOSIS — Z02.89 ENCOUNTER FOR PHYSICAL EXAMINATION RELATED TO EMPLOYMENT: ICD-10-CM

## 2022-12-09 DIAGNOSIS — Z00.00 ENCOUNTER FOR ANNUAL PHYSICAL EXAM: Primary | ICD-10-CM

## 2022-12-09 DIAGNOSIS — K59.01 SLOW TRANSIT CONSTIPATION: ICD-10-CM

## 2022-12-09 PROCEDURE — 99395 PREV VISIT EST AGE 18-39: CPT

## 2022-12-09 NOTE — PROGRESS NOTES
Subjective   Adelaida Blair is a 27 y.o. female.   Chief Complaint   Patient presents with   • Annual Exam       History of Present Illness   Ms. Blair presents here today for annual exam as well as a physical exam prior to new employment.  She denies any acute concerns or complaints today.  Reports no issues with chest pain, shortness of breath, activity intolerance, palpitations, headaches.  She reports that her SI joint pain has been moderately well controlled lately.  She reports ongoing issues with constipation, has not been consistently taking MiraLAX.  The following portions of the patient's history were reviewed and updated as appropriate: allergies, current medications, past family history, past medical history, past social history, past surgical history and problem list.    Review of Systems    Objective   Past Medical History:   Diagnosis Date   • GERD (gastroesophageal reflux disease)       Past Surgical History:   Procedure Laterality Date   • HEAD/NECK LESION/CYST EXCISION Left 11/12/2020    Procedure: Excision of lesion of the left jawline with linear closure;  Surgeon: David Aleman MD;  Location: [facility];  Service: ENT;  Laterality: Left;   • KIDNEY SURGERY Left     partial   • MYRINGOTOMY W/ TUBES          Current Outpatient Medications:   •  azelastine (ASTELIN) 0.1 % nasal spray, 2 sprays into the nostril(s) as directed by provider 2 (Two) Times a Day. Use in each nostril as directed, Disp: 1 each, Rfl: 12  •  cetirizine (zyrTEC) 10 MG tablet, Take 1 tablet by mouth Daily., Disp: 30 tablet, Rfl: 2  •  Cholecalciferol (Vitamin D) 50 MCG (2000 UT) tablet, Take 2,000 Units by mouth Daily., Disp: , Rfl:   •  diclofenac (Cataflam) 50 MG tablet, Take 1 tablet by mouth 3 (Three) Times a Day As Needed (menstural cramps)., Disp: 30 tablet, Rfl: 3  •  Diclofenac Sodium (Voltaren) 1 % gel gel, Apply 4 g topically to the appropriate area as directed 4 (Four) Times a Day As Needed (joint pain)., Disp:  "300 g, Rfl: 2  •  fluticasone (Flonase) 50 MCG/ACT nasal spray, 2 sprays into the nostril(s) as directed by provider Daily., Disp: 1 g, Rfl: 3  •  hyoscyamine (Levsin) 0.125 MG tablet, Take 1 tablet by mouth Every 4 (Four) Hours As Needed for Cramping., Disp: 120 tablet, Rfl: 1  •  montelukast (Singulair) 10 MG tablet, Take 1 tablet by mouth Every Night., Disp: 30 tablet, Rfl: 3  •  omeprazole (priLOSEC) 40 MG capsule, Take 1 capsule by mouth Daily., Disp: 90 capsule, Rfl: 3  •  ondansetron ODT (Zofran ODT) 4 MG disintegrating tablet, Place 1 tablet on the tongue Every 8 (Eight) Hours As Needed for Nausea or Vomiting., Disp: 10 tablet, Rfl: 0  •  Prenatal Vit-Fe Fumarate-FA (PRENATAL VITAMIN AND MINERAL PO), Take  by mouth., Disp: , Rfl:   •  triamcinolone (KENALOG) 0.1 % paste, Apply  to teeth 2 (Two) Times a Day., Disp: 5 g, Rfl: 12      Pulse 91   Temp 97.1 °F (36.2 °C) (Temporal)   Ht 160 cm (63\")   Wt 79.2 kg (174 lb 9.6 oz)   LMP 11/28/2022 (Exact Date)   SpO2 99%   Breastfeeding No   BMI 30.93 kg/m²      Body mass index is 30.93 kg/m².  BMI is >= 30 and <35. (Class 1 Obesity). The following options were offered after discussion;: exercise counseling/recommendations and nutrition counseling/recommendations       Physical Exam  Vitals and nursing note reviewed.   Constitutional:       General: She is not in acute distress.     Appearance: Normal appearance. She is obese. She is not ill-appearing, toxic-appearing or diaphoretic.   HENT:      Head: Normocephalic and atraumatic.      Right Ear: Ear canal and external ear normal. There is no impacted cerumen.      Left Ear: Ear canal and external ear normal. There is no impacted cerumen.      Nose: Nose normal. No congestion or rhinorrhea.      Mouth/Throat:      Mouth: Mucous membranes are moist.      Pharynx: Oropharynx is clear. No oropharyngeal exudate or posterior oropharyngeal erythema.   Eyes:      General:         Right eye: No discharge.         " Left eye: No discharge.      Extraocular Movements: Extraocular movements intact.      Conjunctiva/sclera: Conjunctivae normal.      Pupils: Pupils are equal, round, and reactive to light.   Neck:      Thyroid: No thyroid mass, thyromegaly or thyroid tenderness.      Vascular: No carotid bruit.   Cardiovascular:      Rate and Rhythm: Normal rate and regular rhythm.      Pulses: Normal pulses.      Heart sounds: Normal heart sounds. No murmur heard.    No friction rub. No gallop.   Pulmonary:      Effort: Pulmonary effort is normal.      Breath sounds: Normal breath sounds.   Abdominal:      General: Bowel sounds are normal. There is no distension.      Palpations: Abdomen is soft.      Tenderness: There is no abdominal tenderness. There is no guarding.   Musculoskeletal:         General: Normal range of motion.      Cervical back: Normal range of motion and neck supple. No rigidity or tenderness.      Right lower leg: No edema.      Left lower leg: No edema.   Lymphadenopathy:      Cervical: No cervical adenopathy.   Skin:     General: Skin is warm and dry.      Capillary Refill: Capillary refill takes less than 2 seconds.   Neurological:      General: No focal deficit present.      Mental Status: She is alert and oriented to person, place, and time. Mental status is at baseline.      Sensory: No sensory deficit.      Motor: No weakness.   Psychiatric:         Mood and Affect: Mood normal.         Behavior: Behavior normal.         Thought Content: Thought content normal.         Judgment: Judgment normal.               Assessment & Plan   Diagnoses and all orders for this visit:    1. Encounter for annual physical exam (Primary)  -     Comprehensive Metabolic Panel; Future  -     CBC & Differential; Future  -     Lipid Panel; Future  -     TSH; Future  -     Urinalysis With Microscopic - Urine, Clean Catch; Future    2. Encounter for physical examination related to employment    3. Vitamin D deficiency  -      Vitamin D,25-Hydroxy; Future    4. Slow transit constipation               Plan of care reviewed with Ms. Blair  We will obtain fasting lab work on Monday, she will perform TB skin test on Monday as well for new employer.  We will reassess a vitamin D level given history of deficiency and inconsistency with taking supplementation  Advised taking MiraLAX daily to help manage constipation.  Up-to-date on all care gaps  Recommended routine ophthalmology and orthodontic exams  Return in 1 year for annual wellness, sooner than this if indicated.

## 2023-03-28 RX ORDER — MONTELUKAST SODIUM 10 MG/1
10 TABLET ORAL NIGHTLY
Qty: 30 TABLET | Refills: 11 | Status: SHIPPED | OUTPATIENT
Start: 2023-03-28

## 2023-09-20 DIAGNOSIS — M79.642 PAIN IN BOTH HANDS: ICD-10-CM

## 2023-09-20 DIAGNOSIS — M79.641 PAIN IN BOTH HANDS: ICD-10-CM

## 2023-09-26 RX ORDER — CETIRIZINE HYDROCHLORIDE 10 MG/1
10 TABLET ORAL DAILY
Qty: 90 TABLET | Refills: 3 | Status: SHIPPED | OUTPATIENT
Start: 2023-09-26

## 2024-03-12 DIAGNOSIS — R53.83 FATIGUE, UNSPECIFIED TYPE: ICD-10-CM

## 2024-03-12 DIAGNOSIS — E55.9 VITAMIN D DEFICIENCY: ICD-10-CM

## 2024-03-12 DIAGNOSIS — E66.3 OVERWEIGHT (BMI 25.0-29.9): ICD-10-CM

## 2024-03-12 DIAGNOSIS — Z00.00 ANNUAL PHYSICAL EXAM: Primary | ICD-10-CM

## 2024-03-15 ENCOUNTER — OFFICE VISIT (OUTPATIENT)
Dept: INTERNAL MEDICINE | Facility: CLINIC | Age: 29
End: 2024-03-15
Payer: COMMERCIAL

## 2024-03-15 VITALS
TEMPERATURE: 97.6 F | HEIGHT: 64 IN | BODY MASS INDEX: 28.85 KG/M2 | OXYGEN SATURATION: 99 % | SYSTOLIC BLOOD PRESSURE: 116 MMHG | WEIGHT: 169 LBS | HEART RATE: 68 BPM | DIASTOLIC BLOOD PRESSURE: 68 MMHG

## 2024-03-15 DIAGNOSIS — M79.642 PAIN IN BOTH HANDS: ICD-10-CM

## 2024-03-15 DIAGNOSIS — E66.3 OVERWEIGHT (BMI 25.0-29.9): ICD-10-CM

## 2024-03-15 DIAGNOSIS — M25.561 ACUTE PAIN OF BOTH KNEES: ICD-10-CM

## 2024-03-15 DIAGNOSIS — M79.641 PAIN IN BOTH HANDS: ICD-10-CM

## 2024-03-15 DIAGNOSIS — M25.562 ACUTE PAIN OF BOTH KNEES: ICD-10-CM

## 2024-03-15 DIAGNOSIS — E55.9 VITAMIN D DEFICIENCY: ICD-10-CM

## 2024-03-15 DIAGNOSIS — Z00.00 ENCOUNTER FOR ANNUAL PHYSICAL EXAM: Primary | ICD-10-CM

## 2024-03-15 DIAGNOSIS — K59.01 SLOW TRANSIT CONSTIPATION: ICD-10-CM

## 2024-03-15 DIAGNOSIS — R53.83 OTHER FATIGUE: ICD-10-CM

## 2024-03-15 NOTE — PROGRESS NOTES
"Subjective   Adelaida Blair is a 28 y.o. female.   Chief Complaint   Patient presents with    Annual Exam     Labs printed        History of Present Illness   Mrs. Blair presents to the office today for annual physical.  The following portions of the patient's history were reviewed and updated as appropriate: allergies, current medications, past family history, past medical history, past social history, past surgical history and problem list.    Review of Systems    Objective   Past Medical History:   Diagnosis Date    GERD (gastroesophageal reflux disease)       Past Surgical History:   Procedure Laterality Date    HEAD/NECK LESION/CYST EXCISION Left 11/12/2020    Procedure: Excision of lesion of the left jawline with linear closure;  Surgeon: David Aleman MD;  Location: [facility];  Service: ENT;  Laterality: Left;    KIDNEY SURGERY Left     partial    MYRINGOTOMY W/ TUBES          Current Outpatient Medications:     cetirizine (zyrTEC) 10 MG tablet, Take 1 tablet by mouth Daily., Disp: 90 tablet, Rfl: 3    Cholecalciferol (Vitamin D3) 125 MCG (5000 UT) tablet dispersible, Place 1 tablet on the tongue Every Other Day., Disp: 45 tablet, Rfl: 3    Diclofenac Sodium (Voltaren) 1 % gel gel, Apply 4 g topically to the appropriate area as directed 4 (Four) Times a Day As Needed (joint pain)., Disp: 300 g, Rfl: 3      /68 (BP Location: Left arm, Patient Position: Sitting, Cuff Size: Adult)   Pulse 68   Temp 97.6 °F (36.4 °C) (Infrared)   Ht 162.6 cm (64\")   Wt 76.7 kg (169 lb)   SpO2 99%   BMI 29.01 kg/m²      Body mass index is 29.01 kg/m².  BMI is >= 25 and <30. (Overweight) The following options were offered after discussion;: exercise counseling/recommendations and nutrition counseling/recommendations       Physical Exam  Vitals and nursing note reviewed.   Constitutional:       General: She is not in acute distress.     Appearance: Normal appearance. She is overweight. She is not ill-appearing, " toxic-appearing or diaphoretic.   HENT:      Head: Normocephalic and atraumatic.      Right Ear: Ear canal and external ear normal. There is no impacted cerumen. Tympanic membrane is scarred.      Left Ear: Ear canal and external ear normal. There is no impacted cerumen. Tympanic membrane is scarred.      Nose: Nose normal.      Mouth/Throat:      Mouth: Mucous membranes are moist.      Pharynx: Oropharynx is clear. No oropharyngeal exudate or posterior oropharyngeal erythema.   Eyes:      Extraocular Movements: Extraocular movements intact.      Conjunctiva/sclera: Conjunctivae normal.      Pupils: Pupils are equal, round, and reactive to light.   Neck:      Thyroid: No thyroid mass, thyromegaly or thyroid tenderness.      Vascular: No carotid bruit.   Cardiovascular:      Rate and Rhythm: Normal rate and regular rhythm.      Pulses: Normal pulses.      Heart sounds: Normal heart sounds.   Pulmonary:      Effort: Pulmonary effort is normal.      Breath sounds: Normal breath sounds.   Abdominal:      General: Bowel sounds are normal.      Palpations: Abdomen is soft.   Musculoskeletal:         General: Tenderness (Bilateral knees) present.      Cervical back: Normal range of motion and neck supple.   Skin:     General: Skin is warm and dry.   Neurological:      General: No focal deficit present.      Mental Status: She is alert and oriented to person, place, and time. Mental status is at baseline.   Psychiatric:         Mood and Affect: Mood normal.         Behavior: Behavior normal.         Thought Content: Thought content normal.         Judgment: Judgment normal.               Assessment & Plan   Diagnoses and all orders for this visit:    1. Encounter for annual physical exam (Primary)    2. Vitamin D deficiency  -     Cholecalciferol (Vitamin D3) 125 MCG (5000 UT) tablet dispersible; Place 1 tablet on the tongue Every Other Day.  Dispense: 45 tablet; Refill: 3    3. Slow transit constipation    4. Other  "fatigue    5. Acute pain of both knees       - Diclofenac Sodium (Voltaren) 1 % gel gel; Apply 4 g topically to the appropriate area as directed 4 (Four) Times a Day As Needed (joint pain).  Dispense: 300 g; Refill: 3    6. Overweight (BMI 25.0-29.9)                 Plan of care and recent lab results were reviewed with Mrs. Blair  Overall she reports having had improvement in her health in the last 6 months, since January 22, she has been implementing a \"fruit diet \"she does this typically Monday through Friday, exceptions are made on occasion, she consumes only fruit but does also incorporate a protein shake daily.  She states she has noted that she does not feel as bloated, unfortunately did not see improvements with her constipation, she recently did incorporate fiber supplementation and this does seem to be helping.  She states she overall has been feeling better with her energy levels, she has actually picked up a second part-time job just for fun, she does believe that she became ill with some viral illness in the past several weeks and has continued to have bilateral knee pain as well as some brain fog and decreased energy levels since then.  She has taken some Aleve and that does help with the pain, she prefers to take a more natural approach and has recently started taking turmeric twice daily.  She has been encouraged to continue with current measures, if symptoms do not improve gradually over the next several weeks I recommended that she reach out, may consider other forms of anti-inflammatory treatment such as a Medrol Dosepak.  I have also encouraged her to ensure incorporation of adequate protein intake with her fruit diet, I do not think it would hurt for her to consume 2 protein shakes a day on days she only consumes fruit, based on her labs that we reviewed no signs of anemia are present, only significant abnormality was her vitamin D deficiency, supplementation has been sent in for her.  We " discussed the importance of keeping up with routine ophthalmology and orthodontic exams of which she has been completing  I do recommend that you utilize sunscreen when exposed to prolonged sunlight in the summertime.  Always use her seatbelt when in motorized vehicle  Declines influenza vaccine as well as COVID-19 for today.  We also discussed starting to incorporate some form of exercise to assist with her current weight loss journey, she has lost 10 pounds already with her dietary changes, encouraged to keep up the great work.  Return to the office as needed, otherwise we will see in 1 year for annual physical.      Please note that portions of this note were completed with a voice recognition program.     Electronically signed by ROB Tristan, 03/15/24, 15:05 CDT.

## 2024-06-19 ENCOUNTER — OFFICE VISIT (OUTPATIENT)
Dept: INTERNAL MEDICINE | Facility: CLINIC | Age: 29
End: 2024-06-19
Payer: COMMERCIAL

## 2024-06-19 VITALS
BODY MASS INDEX: 29.12 KG/M2 | WEIGHT: 170.6 LBS | HEIGHT: 64 IN | DIASTOLIC BLOOD PRESSURE: 70 MMHG | SYSTOLIC BLOOD PRESSURE: 106 MMHG | TEMPERATURE: 97.6 F | HEART RATE: 63 BPM | OXYGEN SATURATION: 99 %

## 2024-06-19 DIAGNOSIS — S39.012A STRAIN OF LUMBAR REGION, INITIAL ENCOUNTER: ICD-10-CM

## 2024-06-19 DIAGNOSIS — M47.818 SI JOINT ARTHRITIS: ICD-10-CM

## 2024-06-19 DIAGNOSIS — R53.82 CHRONIC FATIGUE: ICD-10-CM

## 2024-06-19 DIAGNOSIS — N28.1 RENAL CYST, ACQUIRED, LEFT: ICD-10-CM

## 2024-06-19 DIAGNOSIS — E55.9 VITAMIN D INSUFFICIENCY: ICD-10-CM

## 2024-06-19 DIAGNOSIS — N92.1 MENORRHAGIA WITH IRREGULAR CYCLE: Primary | ICD-10-CM

## 2024-06-19 PROBLEM — M46.1 SI JOINT ARTHRITIS: Status: ACTIVE | Noted: 2024-06-19

## 2024-06-19 LAB
B-HCG UR QL: NEGATIVE
BILIRUB BLD-MCNC: NEGATIVE MG/DL
CLARITY, POC: ABNORMAL
COLOR UR: ABNORMAL
EXPIRATION DATE: ABNORMAL
EXPIRATION DATE: NORMAL
GLUCOSE UR STRIP-MCNC: NEGATIVE MG/DL
INTERNAL NEGATIVE CONTROL: NORMAL
INTERNAL POSITIVE CONTROL: NORMAL
KETONES UR QL: NEGATIVE
LEUKOCYTE EST, POC: NEGATIVE
Lab: ABNORMAL
Lab: NORMAL
NITRITE UR-MCNC: NEGATIVE MG/ML
PH UR: 6 [PH] (ref 5–8)
PROT UR STRIP-MCNC: NEGATIVE MG/DL
RBC # UR STRIP: ABNORMAL /UL
SP GR UR: 1.03 (ref 1–1.03)
UROBILINOGEN UR QL: ABNORMAL

## 2024-06-19 PROCEDURE — 81003 URINALYSIS AUTO W/O SCOPE: CPT | Performed by: NURSE PRACTITIONER

## 2024-06-19 PROCEDURE — 99214 OFFICE O/P EST MOD 30 MIN: CPT | Performed by: NURSE PRACTITIONER

## 2024-06-19 PROCEDURE — 81025 URINE PREGNANCY TEST: CPT | Performed by: NURSE PRACTITIONER

## 2024-06-19 PROCEDURE — 96372 THER/PROPH/DIAG INJ SC/IM: CPT | Performed by: NURSE PRACTITIONER

## 2024-06-19 RX ORDER — ERGOCALCIFEROL 1.25 MG/1
50000 CAPSULE ORAL WEEKLY
Qty: 12 CAPSULE | Refills: 1 | Status: SHIPPED | OUTPATIENT
Start: 2024-06-19

## 2024-06-19 RX ORDER — TRIAMCINOLONE ACETONIDE 40 MG/ML
60 INJECTION, SUSPENSION INTRA-ARTICULAR; INTRAMUSCULAR ONCE
Status: COMPLETED | OUTPATIENT
Start: 2024-06-19 | End: 2024-06-19

## 2024-06-19 RX ORDER — KETOROLAC TROMETHAMINE 30 MG/ML
30 INJECTION, SOLUTION INTRAMUSCULAR; INTRAVENOUS ONCE
Status: COMPLETED | OUTPATIENT
Start: 2024-06-19 | End: 2024-06-19

## 2024-06-19 RX ADMIN — KETOROLAC TROMETHAMINE 30 MG: 30 INJECTION, SOLUTION INTRAMUSCULAR; INTRAVENOUS at 08:42

## 2024-06-19 RX ADMIN — TRIAMCINOLONE ACETONIDE 60 MG: 40 INJECTION, SUSPENSION INTRA-ARTICULAR; INTRAMUSCULAR at 08:43

## 2024-06-19 NOTE — PROGRESS NOTES
Subjective     Chief Complaint:  Back pain.  Fatigue.    HPI:  Patient presents to the office today with complaints of right-sided back pain for at least the last 2 weeks.  She denies any injuries or trauma, but has been lifting heavy boxes at times at work.  She has chronic left-sided back pain from known left SI joint arthritis.  She has also been experiencing fatigue out of the ordinary since March.  She believes she may have had COVID in March but did not get tested for this.  Please see assessment and plan below.    Patient's PMR from outside medical facility reviewed and noted.    Past Medical History:   Past Medical History:   Diagnosis Date    GERD (gastroesophageal reflux disease)      Past Surgical History:  Past Surgical History:   Procedure Laterality Date    HEAD/NECK LESION/CYST EXCISION Left 11/12/2020    Procedure: Excision of lesion of the left jawline with linear closure;  Surgeon: David Aleman MD;  Location: [facility];  Service: ENT;  Laterality: Left;    KIDNEY SURGERY Left     partial    MYRINGOTOMY W/ TUBES       Social History:  reports that she has never smoked. She has never used smokeless tobacco. She reports current alcohol use. She reports that she does not use drugs.    Family History: family history includes Diabetes in her father; Hyperlipidemia in her father; Skin cancer in her father.      Allergies:  No Known Allergies  Medications:  Prior to Admission medications    Medication Sig Start Date End Date Taking? Authorizing Provider   cetirizine (zyrTEC) 10 MG tablet Take 1 tablet by mouth Daily. 9/26/23  Yes Maxi Smileyva C, APRN   Diclofenac Sodium (Voltaren) 1 % gel gel Apply 4 g topically to the appropriate area as directed 4 (Four) Times a Day As Needed (joint pain). 3/15/24  Yes Baljit, [place] C, APRN   Cholecalciferol (Vitamin D3) 125 MCG (5000 UT) tablet dispersible Place 1 tablet on the tongue Every Other Day. 3/15/24 6/19/24 Yes Maxi Smileyva C APRN  "  vitamin D (ERGOCALCIFEROL) 1.25 MG (80237 UT) capsule capsule Take 1 capsule by mouth 1 (One) Time Per Week. 6/19/24   Hiral Singh APRN       Objective     Vital Signs: /70 (BP Location: Left arm, Patient Position: Lying, Cuff Size: Adult)   Pulse 63   Temp 97.6 °F (36.4 °C) (Temporal)   Ht 162.6 cm (64.02\")   Wt 77.4 kg (170 lb 9.6 oz)   LMP 06/17/2024   SpO2 99%   BMI 29.27 kg/m²   Physical Exam  Vitals and nursing note reviewed.   Constitutional:       General: She is not in acute distress.     Appearance: She is not ill-appearing or toxic-appearing.   HENT:      Head: Normocephalic and atraumatic.      Mouth/Throat:      Mouth: Mucous membranes are moist.      Pharynx: Oropharynx is clear.   Cardiovascular:      Rate and Rhythm: Normal rate and regular rhythm.      Pulses: Normal pulses.      Heart sounds: Normal heart sounds.   Pulmonary:      Effort: Pulmonary effort is normal.      Breath sounds: No wheezing, rhonchi or rales.   Abdominal:      General: Bowel sounds are normal. There is no distension.      Palpations: Abdomen is soft.      Tenderness: There is no abdominal tenderness.   Musculoskeletal:         General: No swelling or tenderness. Normal range of motion.      Cervical back: Normal range of motion and neck supple. No tenderness.   Skin:     General: Skin is warm and dry.      Findings: No erythema or rash.   Neurological:      General: No focal deficit present.      Mental Status: She is alert and oriented to person, place, and time.   Psychiatric:         Mood and Affect: Mood normal.         Behavior: Behavior normal.         Thought Content: Thought content normal.         Judgment: Judgment normal.       Results Reviewed:  POC Urinalysis Dipstick, Automated (06/19/2024 08:38)  POCT pregnancy, urine (06/19/2024 08:36)  CT Abdomen Pelvis With & Without Contrast (07/22/2022 08:05)   Microscopic Examination - (03/13/2024 07:59)  CBC & Differential (03/13/2024 " 07:59)  Comprehensive Metabolic Panel (03/13/2024 07:59)  Lipid Panel (03/13/2024 07:59)  Vitamin D,25-Hydroxy (03/13/2024 07:59)  TSH Rfx On Abnormal To Free T4 (03/13/2024 07:59)  Urinalysis With Microscopic - Urine, Clean Catch (03/13/2024 07:59)    Assessment / Plan     Assessment/Plan:  Diagnoses and all orders for this visit:    1. Menorrhagia with irregular cycle (Primary)  -     POC Urinalysis Dipstick, Automated  -     Iron Profile  -     POCT pregnancy, urine  -     CBC (No Diff)    2. Vitamin D insufficiency  -     vitamin D (ERGOCALCIFEROL) 1.25 MG (11049 UT) capsule capsule; Take 1 capsule by mouth 1 (One) Time Per Week.  Dispense: 12 capsule; Refill: 1    3. Chronic fatigue  -     Vitamin B12    4. Strain of lumbar region, initial encounter  -     ketorolac (TORADOL) injection 30 mg  -     triamcinolone acetonide (KENALOG-40) injection 60 mg    5. Renal cyst, acquired, left  -     CT Abdomen Pelvis With & Without Contrast; Future    6. SI joint arthritis  -     CT Abdomen Pelvis With & Without Contrast; Future       Patient presents to the office today with complaints of right-sided back pain for at least the last 2 weeks.  She denies any injuries or trauma, but has been lifting heavy boxes at times at work.  She has chronic left-sided back pain from known left SI joint arthritis and does not necessarily feel that this has been any worse than normal.  She describes the pain on the right side as a sharp pain which is worse with bending or twisting at the waist, or with lifting.  She denies any radiation of pain.  She denies any loss of bowel or bladder control or saddle anesthesia.  She is having no urinary symptoms.  Her urinalysis in the office today was unremarkable other than presence of blood, and she is currently menstruating.  She does take medication intermittently for back pain but tries to go without medication if able.  She does perform stretching exercises most every night, and has tried  utilizing both heat and ice on her back which have not seemed to help much.  She denies any muscle cramping or spasming.  Patient likely has a muscle strain secondary to heavy lifting, however will need to monitor symptoms closely.  She will be given Toradol and Kenalog injections in the office today.  She is to call the office for any worsening or no improvement of her symptoms.    She has also been experiencing fatigue out of the ordinary since March.  She believes she may have had COVID in March but did not get tested for this, but has been feeling very fatigued since that time.  She has also noted since that time her periods have been more irregular and she has been spotting more prior to her periods.  She has been spotting after intercourse as well.  Denies pain with intercourse.  She has had increased blood clots noted with her menstrual cycles.  We did check a urine pregnancy test today, which was negative.  We will check a CBC and iron profile today to see if this may possibly be contributory to her fatigue, but she may have a postviral fatigue if in fact she did have COVID or another viral illness.  Do not feel that it would be necessary to check COVID antibodies at this time as this would not .  We will also check a vitamin B12 level to see if this could be low, which could be contributory to fatigue.  She does have known vitamin D insufficiency, vitamin D level was measured at 20.8 at her last office visit in March for her annual physical.  We did discuss that this could contribute to her fatigue as well.  It was recommended for her to take over-the-counter supplementation daily, however she has only been taking this once a week at most at 1000 units.  Will prescribe 50,000 units once weekly.  Patient is agreeable to take this.    Of note, the patient has left renal cysts which have previously been followed by nephrology.  Last imaging was in July 2022.  It was recommended by the  radiologist to continue to follow with this.  As it has been 2 years since her last imaging I believe it would be reasonable to repeat imaging at this time.  She did have a small punctate nonobstructing calculus involving the right kidney on that image as well I do not feel that her current back pain would be consistent with renal colic.    Return in about 3 months (around 9/19/2024) for Recheck- fatigue. unless patient needs to be seen sooner or acute issues arise.    I have discussed the patient results/orders and and plan/recommendation with them at today's visit.      Hiral Singh, APRN   06/19/2024

## 2024-06-20 LAB
ERYTHROCYTE [DISTWIDTH] IN BLOOD BY AUTOMATED COUNT: 14 % (ref 11.7–15.4)
HCT VFR BLD AUTO: 40.5 % (ref 34–46.6)
HGB BLD-MCNC: 13.4 G/DL (ref 11.1–15.9)
IRON SATN MFR SERPL: 12 % (ref 15–55)
IRON SERPL-MCNC: 50 UG/DL (ref 27–159)
MCH RBC QN AUTO: 26.3 PG (ref 26.6–33)
MCHC RBC AUTO-ENTMCNC: 33.1 G/DL (ref 31.5–35.7)
MCV RBC AUTO: 80 FL (ref 79–97)
PLATELET # BLD AUTO: 326 X10E3/UL (ref 150–450)
RBC # BLD AUTO: 5.09 X10E6/UL (ref 3.77–5.28)
TIBC SERPL-MCNC: 410 UG/DL (ref 250–450)
UIBC SERPL-MCNC: 360 UG/DL (ref 131–425)
VIT B12 SERPL-MCNC: 586 PG/ML (ref 232–1245)
WBC # BLD AUTO: 8.2 X10E3/UL (ref 3.4–10.8)

## 2024-06-20 NOTE — PROGRESS NOTES
Anemia seems to be at baseline.  Iron panel looks good, do not think that she would need an iron supplement at this time.  Vitamin B12 level is normal.  I think the fatigue that she is experiencing is likely postviral from the viral illness she had in March as well as from her vitamin D deficiency so she needs to start the weekly vitamin D supplement I sent to her pharmacy.

## 2024-07-11 ENCOUNTER — HOSPITAL ENCOUNTER (OUTPATIENT)
Dept: CT IMAGING | Facility: HOSPITAL | Age: 29
Discharge: HOME | End: 2024-07-11
Payer: COMMERCIAL

## 2024-08-19 ENCOUNTER — TELEPHONE (OUTPATIENT)
Dept: INTERNAL MEDICINE | Facility: CLINIC | Age: 29
End: 2024-08-19

## 2024-08-23 ENCOUNTER — OFFICE VISIT (OUTPATIENT)
Dept: INTERNAL MEDICINE | Facility: CLINIC | Age: 29
End: 2024-08-23
Payer: COMMERCIAL

## 2024-08-23 VITALS
TEMPERATURE: 97.8 F | DIASTOLIC BLOOD PRESSURE: 60 MMHG | HEIGHT: 64 IN | BODY MASS INDEX: 29.02 KG/M2 | WEIGHT: 170 LBS | OXYGEN SATURATION: 98 % | HEART RATE: 69 BPM | SYSTOLIC BLOOD PRESSURE: 96 MMHG

## 2024-08-23 DIAGNOSIS — R10.31 GROIN PAIN, RIGHT: ICD-10-CM

## 2024-08-23 DIAGNOSIS — E55.9 VITAMIN D INSUFFICIENCY: Primary | ICD-10-CM

## 2024-08-23 DIAGNOSIS — R19.09 GROIN SWELLING: ICD-10-CM

## 2024-08-23 PROCEDURE — 99213 OFFICE O/P EST LOW 20 MIN: CPT

## 2024-08-23 NOTE — PROGRESS NOTES
"Subjective   Adelaida Blair is a 28 y.o. female.   Chief Complaint   Patient presents with    Fatigue     2 month follow up, recheck Vit D labs    Groin Pain     R side onset a couple of weeks ago, feels like a cramp       Ms. Blair presents to the office today for a 2-month follow-up on fatigue as well as complaints of right groin pain.  Please see below for additional HPI, assessment, and plan.    The following portions of the patient's history were reviewed and updated as appropriate: allergies, current medications, past family history, past medical history, past social history, past surgical history and problem list.    Review of Systems   Constitutional:  Positive for fatigue.       Objective   Past Medical History:   Diagnosis Date    GERD (gastroesophageal reflux disease)       Past Surgical History:   Procedure Laterality Date    HEAD/NECK LESION/CYST EXCISION Left 11/12/2020    Procedure: Excision of lesion of the left jawline with linear closure;  Surgeon: David Aleman MD;  Location: [facility];  Service: ENT;  Laterality: Left;    KIDNEY SURGERY Left     partial    MYRINGOTOMY W/ TUBES          Current Outpatient Medications:     cetirizine (zyrTEC) 10 MG tablet, Take 1 tablet by mouth Daily., Disp: 90 tablet, Rfl: 3    Diclofenac Sodium (Voltaren) 1 % gel gel, Apply 4 g topically to the appropriate area as directed 4 (Four) Times a Day As Needed (joint pain)., Disp: 300 g, Rfl: 3    vitamin D (ERGOCALCIFEROL) 1.25 MG (49605 UT) capsule capsule, Take 1 capsule by mouth 1 (One) Time Per Week., Disp: 12 capsule, Rfl: 1      BP 96/60 (BP Location: Left arm, Patient Position: Sitting, Cuff Size: Adult)   Pulse 69   Temp 97.8 °F (36.6 °C) (Temporal)   Ht 162.6 cm (64.02\")   Wt 77.1 kg (170 lb)   LMP 08/13/2024   SpO2 98%   BMI 29.17 kg/m²      Body mass index is 29.17 kg/m².          Physical Exam  Vitals and nursing note reviewed.   Constitutional:       General: She is not in acute distress.     " Appearance: Normal appearance. She is overweight. She is not ill-appearing, toxic-appearing or diaphoretic.   HENT:      Head: Normocephalic and atraumatic.   Eyes:      Extraocular Movements: Extraocular movements intact.      Conjunctiva/sclera: Conjunctivae normal.      Pupils: Pupils are equal, round, and reactive to light.   Cardiovascular:      Rate and Rhythm: Normal rate and regular rhythm.      Pulses: Normal pulses.      Heart sounds: Normal heart sounds.   Pulmonary:      Effort: Pulmonary effort is normal.      Breath sounds: Normal breath sounds.   Abdominal:      General: Bowel sounds are normal.      Palpations: Abdomen is soft.   Musculoskeletal:         General: Normal range of motion.      Cervical back: Normal range of motion and neck supple.        Legs:       Comments: No palpable mass, what appears to be possible lymph node enlargement was palpated however similar characteristic to left side, no pain on left side, pain is present and is increased with palpation of right groin area.   Skin:     General: Skin is warm and dry.   Neurological:      General: No focal deficit present.      Mental Status: She is alert and oriented to person, place, and time. Mental status is at baseline.   Psychiatric:         Mood and Affect: Mood normal.         Behavior: Behavior normal.         Thought Content: Thought content normal.         Judgment: Judgment normal.               Assessment & Plan   Diagnoses and all orders for this visit:    1. Vitamin D insufficiency (Primary)  -     Vitamin D 25 hydroxy    2. Groin pain, right  -     US Soft Tissue; Future    3. Groin swelling  -     US Soft Tissue; Future               Plan of care reviewed with Ms. Blair  Per reports she is noting improvement in her fatigue, has been taking her vitamin D supplementation so we will plan on reevaluation today.  She has noted the greatest improvement with starting an exercise regimen at the gym in the past 2 weeks, she  states she is working on increasing her water intake.  She is adhering to a healthy diet and is implementing portion control, she has noted improvement in abdominal waist circumference and has been feeling better.  I congratulated her on her efforts and asked her to continue with them.    She also reports having noted some right groin discomfort in the last week, she states that it has been bothering her on and off and she describes it as a cramping aching sensation.  She is wondering if an ultrasound would be appropriate for further evaluation.  Of note, she has in the last 2 weeks started working out at the gym, went over some of the exercises that she has been doing and nothing seems to identify as a potential cause of this pain and when we did exercises here in the office today there was no pain with adduction or abduction  - right groin was examined, initially thought I may have noted some lymphadenopathy however when left side was examined it appears similar in characteristics, however given her pain and concern we will proceed with doing an ultrasound of the area of concern just in case.  She is aware she can get this completed at the [facility] lab and imaging by [facility] at her convenience.    Please return at your next scheduled appointment with as needed visits prior to.    Please note that portions of this note were completed with a voice recognition program.     Electronically signed by ROB Tristan, 08/23/24, 17:22 CDT.

## 2024-08-24 LAB — 25(OH)D3+25(OH)D2 SERPL-MCNC: 62.8 NG/ML (ref 30–100)

## 2024-08-26 NOTE — PROGRESS NOTES
Vitamin D level has improved nicely and is currently at 62.8, continue with current supplementation and lifestyle modifications.

## 2024-08-28 ENCOUNTER — TELEPHONE (OUTPATIENT)
Dept: INTERNAL MEDICINE | Facility: CLINIC | Age: 29
End: 2024-08-28
Payer: COMMERCIAL

## 2024-08-28 NOTE — TELEPHONE ENCOUNTER
"LVM for patient to call office to relay results -         Per Jessica Smiley, APRN:  \"Vitamin D level has improved nicely and is currently at 62.8, continue with current supplementation and lifestyle modifications.\"     "

## 2024-09-13 ENCOUNTER — OFFICE VISIT (OUTPATIENT)
Dept: INTERNAL MEDICINE | Facility: CLINIC | Age: 29
End: 2024-09-13
Payer: COMMERCIAL

## 2024-09-13 VITALS
HEIGHT: 64 IN | TEMPERATURE: 97.7 F | BODY MASS INDEX: 28.71 KG/M2 | DIASTOLIC BLOOD PRESSURE: 64 MMHG | OXYGEN SATURATION: 98 % | SYSTOLIC BLOOD PRESSURE: 102 MMHG | HEART RATE: 86 BPM | WEIGHT: 168.2 LBS

## 2024-09-13 DIAGNOSIS — K21.9 GASTROESOPHAGEAL REFLUX DISEASE, UNSPECIFIED WHETHER ESOPHAGITIS PRESENT: Primary | ICD-10-CM

## 2024-09-13 DIAGNOSIS — G44.209 TENSION HEADACHE: ICD-10-CM

## 2024-09-13 DIAGNOSIS — R51.9 NEW ONSET HEADACHE: ICD-10-CM

## 2024-09-13 DIAGNOSIS — D64.9 ANEMIA, UNSPECIFIED TYPE: ICD-10-CM

## 2024-09-13 PROCEDURE — 99214 OFFICE O/P EST MOD 30 MIN: CPT

## 2024-09-13 RX ORDER — OMEPRAZOLE 20 MG/1
20 TABLET, DELAYED RELEASE ORAL EVERY MORNING
Qty: 14 TABLET | Refills: 0 | Status: SHIPPED | OUTPATIENT
Start: 2024-09-13

## 2024-09-13 RX ORDER — SUMATRIPTAN 50 MG/1
TABLET, FILM COATED ORAL
Qty: 9 TABLET | Refills: 0 | Status: SHIPPED | OUTPATIENT
Start: 2024-09-13

## 2024-09-14 LAB
BASOPHILS # BLD AUTO: 0 X10E3/UL (ref 0–0.2)
BASOPHILS NFR BLD AUTO: 0 %
BUN SERPL-MCNC: 10 MG/DL (ref 6–20)
BUN/CREAT SERPL: 13 (ref 9–23)
CALCIUM SERPL-MCNC: 9.2 MG/DL (ref 8.7–10.2)
CHLORIDE SERPL-SCNC: 100 MMOL/L (ref 96–106)
CO2 SERPL-SCNC: 22 MMOL/L (ref 20–29)
CREAT SERPL-MCNC: 0.78 MG/DL (ref 0.57–1)
EGFRCR SERPLBLD CKD-EPI 2021: 105 ML/MIN/1.73
EOSINOPHIL # BLD AUTO: 0.1 X10E3/UL (ref 0–0.4)
EOSINOPHIL NFR BLD AUTO: 2 %
ERYTHROCYTE [DISTWIDTH] IN BLOOD BY AUTOMATED COUNT: 14.2 % (ref 11.7–15.4)
GLUCOSE SERPL-MCNC: NORMAL MG/DL
HCT VFR BLD AUTO: 42.3 % (ref 34–46.6)
HGB BLD-MCNC: 13.6 G/DL (ref 11.1–15.9)
IMM GRANULOCYTES # BLD AUTO: 0 X10E3/UL (ref 0–0.1)
IMM GRANULOCYTES NFR BLD AUTO: 0 %
LYMPHOCYTES # BLD AUTO: 1.7 X10E3/UL (ref 0.7–3.1)
LYMPHOCYTES NFR BLD AUTO: 23 %
MAGNESIUM SERPL-MCNC: 2.3 MG/DL (ref 1.6–2.3)
MCH RBC QN AUTO: 26 PG (ref 26.6–33)
MCHC RBC AUTO-ENTMCNC: 32.2 G/DL (ref 31.5–35.7)
MCV RBC AUTO: 81 FL (ref 79–97)
MONOCYTES # BLD AUTO: 0.7 X10E3/UL (ref 0.1–0.9)
MONOCYTES NFR BLD AUTO: 10 %
NEUTROPHILS # BLD AUTO: 4.7 X10E3/UL (ref 1.4–7)
NEUTROPHILS NFR BLD AUTO: 65 %
PLATELET # BLD AUTO: 379 X10E3/UL (ref 150–450)
POTASSIUM SERPL-SCNC: NORMAL MMOL/L
RBC # BLD AUTO: 5.23 X10E6/UL (ref 3.77–5.28)
SODIUM SERPL-SCNC: 140 MMOL/L (ref 134–144)
WBC # BLD AUTO: 7.2 X10E3/UL (ref 3.4–10.8)

## 2024-09-16 NOTE — PROGRESS NOTES
Labs over-all normal -electrolytes are stable/in normal ranges, however it is notable that glucose and potassium were unable to be resulted related to lab error.

## 2024-10-11 DIAGNOSIS — G44.209 TENSION HEADACHE: ICD-10-CM

## 2024-10-11 RX ORDER — BUTALBITAL, ACETAMINOPHEN AND CAFFEINE 50; 325; 40 MG/1; MG/1; MG/1
1 TABLET ORAL EVERY 12 HOURS PRN
Qty: 5 TABLET | Refills: 0 | Status: SHIPPED | OUTPATIENT
Start: 2024-10-11

## 2024-10-11 RX ORDER — SUMATRIPTAN 50 MG/1
TABLET, FILM COATED ORAL
Qty: 9 TABLET | Refills: 0 | Status: SHIPPED | OUTPATIENT
Start: 2024-10-11

## 2024-10-16 ENCOUNTER — TELEPHONE (OUTPATIENT)
Dept: INTERNAL MEDICINE | Facility: CLINIC | Age: 29
End: 2024-10-16

## 2024-10-16 NOTE — TELEPHONE ENCOUNTER
Caller: Adelaida Blair    Relationship to patient: Self    Best call back number: [phone]    Chief complaint: MIGRAINE FOLLOW UP     Type of visit: OFFICE VISIT    Requested date: 10/17/24     Additional notes: PATIENT REQUESTING AN APPT WITH [facility]A ASAP.

## 2024-10-17 ENCOUNTER — OFFICE VISIT (OUTPATIENT)
Dept: INTERNAL MEDICINE | Facility: CLINIC | Age: 29
End: 2024-10-17
Payer: COMMERCIAL

## 2024-10-17 VITALS
HEIGHT: 64 IN | DIASTOLIC BLOOD PRESSURE: 62 MMHG | BODY MASS INDEX: 29.78 KG/M2 | HEART RATE: 65 BPM | SYSTOLIC BLOOD PRESSURE: 100 MMHG | WEIGHT: 174.4 LBS | TEMPERATURE: 97.8 F | OXYGEN SATURATION: 98 %

## 2024-10-17 DIAGNOSIS — G44.52 NEW DAILY PERSISTENT HEADACHE: Primary | ICD-10-CM

## 2024-10-17 DIAGNOSIS — R42 VERTIGO: ICD-10-CM

## 2024-10-17 DIAGNOSIS — R11.0 NAUSEA: ICD-10-CM

## 2024-10-17 PROCEDURE — 99214 OFFICE O/P EST MOD 30 MIN: CPT

## 2024-10-17 RX ORDER — TOPIRAMATE 50 MG/1
50 TABLET, FILM COATED ORAL 2 TIMES DAILY
Qty: 60 TABLET | Refills: 0 | Status: SHIPPED | OUTPATIENT
Start: 2024-10-17

## 2024-10-17 RX ORDER — ONDANSETRON 4 MG/1
4 TABLET, ORALLY DISINTEGRATING ORAL EVERY 8 HOURS PRN
Qty: 30 TABLET | Refills: 0 | Status: SHIPPED | OUTPATIENT
Start: 2024-10-17

## 2024-10-17 NOTE — PROGRESS NOTES
Subjective   Adelaida Blair is a 28 y.o. female.   Chief Complaint   Patient presents with    Migraine     Patient complains of migraine x 1 week.  States she is experiencing sx at least twice a day.        Ms. Blair presents the office today for further evaluation and management of newly persistent daily headaches.  Please see below for additional HPI, assessment, and plan.      The following portions of the patient's history were reviewed and updated as appropriate: allergies, current medications, past family history, past medical history, past social history, past surgical history and problem list.    Review of Systems    Objective   Past Medical History:   Diagnosis Date    GERD (gastroesophageal reflux disease)       Past Surgical History:   Procedure Laterality Date    HEAD/NECK LESION/CYST EXCISION Left 11/12/2020    Procedure: Excision of lesion of the left jawline with linear closure;  Surgeon: David Aleman MD;  Location: [facility];  Service: ENT;  Laterality: Left;    KIDNEY SURGERY Left     partial    MYRINGOTOMY W/ TUBES          Current Outpatient Medications:     butalbital-acetaminophen-caffeine (FIORICET, ESGIC) -40 MG per tablet, Take 1 tablet by mouth Every 12 (Twelve) Hours As Needed for Headache or Migraine., Disp: 5 tablet, Rfl: 0    cetirizine (zyrTEC) 10 MG tablet, Take 1 tablet by mouth Daily., Disp: 90 tablet, Rfl: 3    Diclofenac Sodium (Voltaren) 1 % gel gel, Apply 4 g topically to the appropriate area as directed 4 (Four) Times a Day As Needed (joint pain)., Disp: 300 g, Rfl: 3    omeprazole OTC (PrilOSEC OTC) 20 MG EC tablet, Take 1 tablet by mouth Every Morning., Disp: 14 tablet, Rfl: 0    vitamin D (ERGOCALCIFEROL) 1.25 MG (64933 UT) capsule capsule, Take 1 capsule by mouth 1 (One) Time Per Week., Disp: 12 capsule, Rfl: 1    ondansetron ODT (ZOFRAN-ODT) 4 MG disintegrating tablet, Place 1 tablet on the tongue Every 8 (Eight) Hours As Needed for Nausea or Vomiting., Disp: 30  "tablet, Rfl: 0    Rimegepant Sulfate (NURTEC) 75 MG tablet dispersible tablet, Take 1 tablet by mouth Daily As Needed (Migraine/headache)., Disp: 30 tablet, Rfl: 1    topiramate (TOPAMAX) 50 MG tablet, Take 1 tablet by mouth 2 (Two) Times a Day., Disp: 60 tablet, Rfl: 0      /62 (BP Location: Left arm, Patient Position: Sitting, Cuff Size: Adult)   Pulse 65   Temp 97.8 °F (36.6 °C) (Infrared)   Ht 162.6 cm (64.02\")   Wt 79.1 kg (174 lb 6.4 oz)   SpO2 98%   BMI 29.92 kg/m²      Body mass index is 29.92 kg/m².          Physical Exam  Vitals and nursing note reviewed.   Constitutional:       General: She is not in acute distress.     Appearance: Normal appearance. She is overweight. She is not ill-appearing, toxic-appearing or diaphoretic.   HENT:      Head: Normocephalic and atraumatic.      Right Ear: Tympanic membrane, ear canal and external ear normal. There is no impacted cerumen.      Left Ear: Tympanic membrane, ear canal and external ear normal. There is no impacted cerumen.      Nose: Nose normal.      Mouth/Throat:      Mouth: Mucous membranes are moist.      Pharynx: Oropharynx is clear. No oropharyngeal exudate or posterior oropharyngeal erythema.   Eyes:      General: No visual field deficit.        Right eye: No discharge.         Left eye: No discharge.      Extraocular Movements: Extraocular movements intact.      Conjunctiva/sclera: Conjunctivae normal.      Pupils: Pupils are equal, round, and reactive to light.   Cardiovascular:      Rate and Rhythm: Normal rate and regular rhythm.      Pulses: Normal pulses.      Heart sounds: Normal heart sounds.   Pulmonary:      Effort: Pulmonary effort is normal.      Breath sounds: Normal breath sounds.   Abdominal:      General: Bowel sounds are normal.      Palpations: Abdomen is soft.   Musculoskeletal:         General: Normal range of motion.      Cervical back: Normal range of motion and neck supple. No tenderness.   Skin:     General: Skin is " warm and dry.   Neurological:      General: No focal deficit present.      Mental Status: She is alert and oriented to person, place, and time. Mental status is at baseline.      GCS: GCS eye subscore is 4. GCS verbal subscore is 5. GCS motor subscore is 6.      Cranial Nerves: No cranial nerve deficit, dysarthria or facial asymmetry.      Sensory: No sensory deficit.      Motor: Motor function is intact.      Coordination: Coordination is intact.      Gait: Gait is intact.   Psychiatric:         Mood and Affect: Mood normal.         Behavior: Behavior normal.         Thought Content: Thought content normal.         Judgment: Judgment normal.               Assessment & Plan   Diagnoses and all orders for this visit:    1. New daily persistent headache (Primary)  -     Rimegepant Sulfate (NURTEC) 75 MG tablet dispersible tablet; Take 1 tablet by mouth Daily As Needed (Migraine/headache).  Dispense: 30 tablet; Refill: 1  -     topiramate (TOPAMAX) 50 MG tablet; Take 1 tablet by mouth 2 (Two) Times a Day.  Dispense: 60 tablet; Refill: 0  -     MRI Brain Without Contrast; Future    2. Vertigo  -     MRI Brain Without Contrast; Future    3. Nausea  -     ondansetron ODT (ZOFRAN-ODT) 4 MG disintegrating tablet; Place 1 tablet on the tongue Every 8 (Eight) Hours As Needed for Nausea or Vomiting.  Dispense: 30 tablet; Refill: 0               Plan of care reviewed with MsDarling Johnnie   When I saw her last on 9/13/2024 she had complained of new onset headaches, and had actually occurred for the first time the day prior to that appointment, she described it as initially feeling like something was wrapped around her head, taking a dual action Aleve which typically resolves any pain she ever has without any therapeutic response then and then she felt a pulsating sensation on the top of her head, the headache kept on increasing in severity although she did not have any noise or light sensitivity at the time, she did experience nausea  and lasted for about 2 minutes, she had not slept well the night before that and her headache that time went away after she took a nap.  We discussed how that could have been a tension or stress-induced headache, she was prescribed Imitrex to have available the next time if the headache occurred again.    She has since reached out to me a couple times reporting an increased incidence of these headaches occurring again, on some occasions worsening severity with light and noise sensitivity with nausea but no vomiting.  She states she has also had several occurrences where she has had the headache where she has had vertigo type symptoms where she feels like she is going to be suffering from motion sickness.  She acknowledges that she does spend a lot of time in front of screen either at work or on her phone, she has not had her eyes examined for about 1 year.  She cannot recall experiencing any actual vision changes, no floaters, black spots, peripheral vision cuts and does not feel like she has had any issues with reading or her eyes straining.    In the last week she has had these headaches occurring in the morning and in the afternoon every single day, intensity does vary although she has had 2 occurrences now where she has not been able to drive because of how extremely bad the pain is.  She states it is more primarily a pulsating pain on the top of her head and then eventually becomes her entire head.  She denies feeling any sinus pressure, no maxillary tenderness, no ear fullness, no tinnitus.  She continues to have the correlating symptom of vertigo when these headaches occur.  No recent new medications other than what has been prescribed to help alleviate her headache symptoms.  She has not taken any further NSAIDs since it has not helped in the past, she has not been taking acetaminophen since it has not helped in the past indicating decreased concern that this could be a medication overuse headache.  She  states the Imitrex I prescribed her did help but took hours before it would work so I am questioning whether or not this was actually beneficial.  She states she has not yet used the Fioricet and is saving this only if she absolutely needs to take it.    She has noted in the past several weeks that on some occasions the headache goes away after she eats.  She has checked her blood glucose -yesterday morning when the headache started to occur and her blood glucose was 85.  She has also checked her blood pressures when the headaches have occurred and she states on 1 instance she had a systolic of 140s but when rechecked 5 minutes later the blood pressure was completely normotensive.    Her last period was last week, she states it was unremarkable/the same as her periods usually are.  She states she has been drinking a lot more water so she does not feel like it could be related to dehydration.  She has been trying to make sure she is consuming enough protein.    Given the nature of these headaches and the new daily persistent occurrence I do think that brain imaging is warranted, we will proceed with MRI.  In the meantime she is aware to communicate any changes, any new neurological symptoms, increased severity, or resolution of symptoms.  Neurological exam in office today was unremarkable.    We have ruled out any causative agents/medications, low suspicion for any sinus involvement after exam today, unlikely to be related to dehydration with improved hydration, hypoglycemia ruled out with blood glucose assessment on occurrence of most recent headache.  Unlikely to be hypertension as she does not have hypertension, her one elevated reading was likely a pain response.    She was given a sample of Nurtec in the office today and advised to notify me on efficacy/tolerance of the medication.  Zofran also prescribed in case of nausea.  Given ineffective/subtherapeutic response to Imitrex we will discontinue.  She is also  accepting of initiating prophylactic treatment via Topamax.     I would like to see her back in 3 days for reevaluation, however we will initially proceed with getting the MRI ordered, will see when it gets scheduled and schedule close follow-up after, in the meantime she is aware she can return sooner if needed.  Will keep her next scheduled appointment for now.    Please note that portions of this note were completed with a voice recognition program.     Electronically signed by ROB Tristan, 10/17/24, 11:45 CDT.

## 2024-10-17 NOTE — LETTER
October 17, 2024     Patient: Adelaida Blair   YOB: 1995   Date of Visit: 10/17/2024       To Whom It May Concern:    It is my medical opinion that Adelaida Blair {Work release (duty restriction):02629}.           Sincerely,        ROB Tristan    CC: No Recipients

## 2024-10-18 ENCOUNTER — APPOINTMENT (OUTPATIENT)
Dept: CT IMAGING | Facility: HOSPITAL | Age: 29
End: 2024-10-18
Payer: COMMERCIAL

## 2024-10-18 ENCOUNTER — HOSPITAL ENCOUNTER (EMERGENCY)
Facility: HOSPITAL | Age: 29
Discharge: HOME | End: 2024-10-18
Payer: COMMERCIAL

## 2024-10-18 VITALS
OXYGEN SATURATION: 100 % | DIASTOLIC BLOOD PRESSURE: 84 MMHG | BODY MASS INDEX: 30.65 KG/M2 | SYSTOLIC BLOOD PRESSURE: 115 MMHG | HEIGHT: 63 IN | HEART RATE: 68 BPM | RESPIRATION RATE: 18 BRPM | TEMPERATURE: 98 F | WEIGHT: 173 LBS

## 2024-10-18 DIAGNOSIS — G44.52 NEW PERSISTENT DAILY HEADACHE: Primary | ICD-10-CM

## 2024-10-18 DIAGNOSIS — R51.9 FRONTAL HEADACHE: ICD-10-CM

## 2024-10-18 LAB
ALBUMIN SERPL-MCNC: 4.5 G/DL (ref 3.5–5.2)
ALBUMIN/GLOB SERPL: 1.5 G/DL
ALP SERPL-CCNC: 68 U/L (ref 39–117)
ALT SERPL W P-5'-P-CCNC: 15 U/L (ref 1–33)
ANION GAP SERPL CALCULATED.3IONS-SCNC: 10 MMOL/L (ref 5–15)
AST SERPL-CCNC: 15 U/L (ref 1–32)
BASOPHILS # BLD AUTO: 0.04 10*3/MM3 (ref 0–0.2)
BASOPHILS NFR BLD AUTO: 0.4 % (ref 0–1.5)
BILIRUB SERPL-MCNC: 0.2 MG/DL (ref 0–1.2)
BUN SERPL-MCNC: 15 MG/DL (ref 6–20)
BUN/CREAT SERPL: 24.2 (ref 7–25)
CALCIUM SPEC-SCNC: 9.2 MG/DL (ref 8.6–10.5)
CHLORIDE SERPL-SCNC: 101 MMOL/L (ref 98–107)
CO2 SERPL-SCNC: 25 MMOL/L (ref 22–29)
CREAT SERPL-MCNC: 0.62 MG/DL (ref 0.57–1)
CRP SERPL-MCNC: 0.84 MG/DL (ref 0–0.5)
DEPRECATED RDW RBC AUTO: 40.4 FL (ref 37–54)
EGFRCR SERPLBLD CKD-EPI 2021: 123.8 ML/MIN/1.73
EOSINOPHIL # BLD AUTO: 0.16 10*3/MM3 (ref 0–0.4)
EOSINOPHIL NFR BLD AUTO: 1.5 % (ref 0.3–6.2)
ERYTHROCYTE [DISTWIDTH] IN BLOOD BY AUTOMATED COUNT: 14.2 % (ref 12.3–15.4)
GLOBULIN UR ELPH-MCNC: 3.1 GM/DL
GLUCOSE SERPL-MCNC: 93 MG/DL (ref 65–99)
HCG SERPL QL: NEGATIVE
HCT VFR BLD AUTO: 41.5 % (ref 34–46.6)
HGB BLD-MCNC: 13.5 G/DL (ref 12–15.9)
IMM GRANULOCYTES # BLD AUTO: 0.03 10*3/MM3 (ref 0–0.05)
IMM GRANULOCYTES NFR BLD AUTO: 0.3 % (ref 0–0.5)
LYMPHOCYTES # BLD AUTO: 2.79 10*3/MM3 (ref 0.7–3.1)
LYMPHOCYTES NFR BLD AUTO: 26.6 % (ref 19.6–45.3)
MAGNESIUM SERPL-MCNC: 2.1 MG/DL (ref 1.6–2.6)
MCH RBC QN AUTO: 25.6 PG (ref 26.6–33)
MCHC RBC AUTO-ENTMCNC: 32.5 G/DL (ref 31.5–35.7)
MCV RBC AUTO: 78.6 FL (ref 79–97)
MONOCYTES # BLD AUTO: 0.65 10*3/MM3 (ref 0.1–0.9)
MONOCYTES NFR BLD AUTO: 6.2 % (ref 5–12)
NEUTROPHILS NFR BLD AUTO: 6.83 10*3/MM3 (ref 1.7–7)
NEUTROPHILS NFR BLD AUTO: 65 % (ref 42.7–76)
NRBC BLD AUTO-RTO: 0 /100 WBC (ref 0–0.2)
PLATELET # BLD AUTO: 408 10*3/MM3 (ref 140–450)
PMV BLD AUTO: 9.6 FL (ref 6–12)
POTASSIUM SERPL-SCNC: 3.5 MMOL/L (ref 3.5–5.2)
PROT SERPL-MCNC: 7.6 G/DL (ref 6–8.5)
RBC # BLD AUTO: 5.28 10*6/MM3 (ref 3.77–5.28)
SODIUM SERPL-SCNC: 136 MMOL/L (ref 136–145)
WBC NRBC COR # BLD AUTO: 10.5 10*3/MM3 (ref 3.4–10.8)

## 2024-10-18 PROCEDURE — 96375 TX/PRO/DX INJ NEW DRUG ADDON: CPT

## 2024-10-18 PROCEDURE — 25010000002 DEXAMETHASONE PER 1 MG: Performed by: NURSE PRACTITIONER

## 2024-10-18 PROCEDURE — 25010000002 KETOROLAC TROMETHAMINE PER 15 MG: Performed by: NURSE PRACTITIONER

## 2024-10-18 PROCEDURE — 96374 THER/PROPH/DIAG INJ IV PUSH: CPT

## 2024-10-18 PROCEDURE — 85025 COMPLETE CBC W/AUTO DIFF WBC: CPT | Performed by: NURSE PRACTITIONER

## 2024-10-18 PROCEDURE — 25010000002 DIPHENHYDRAMINE PER 50 MG: Performed by: NURSE PRACTITIONER

## 2024-10-18 PROCEDURE — 70450 CT HEAD/BRAIN W/O DYE: CPT

## 2024-10-18 PROCEDURE — 25010000002 PROCHLORPERAZINE 10 MG/2ML SOLUTION: Performed by: NURSE PRACTITIONER

## 2024-10-18 PROCEDURE — 86140 C-REACTIVE PROTEIN: CPT | Performed by: NURSE PRACTITIONER

## 2024-10-18 PROCEDURE — 80053 COMPREHEN METABOLIC PANEL: CPT | Performed by: NURSE PRACTITIONER

## 2024-10-18 PROCEDURE — 99284 EMERGENCY DEPT VISIT MOD MDM: CPT

## 2024-10-18 PROCEDURE — 84703 CHORIONIC GONADOTROPIN ASSAY: CPT | Performed by: NURSE PRACTITIONER

## 2024-10-18 PROCEDURE — 83735 ASSAY OF MAGNESIUM: CPT | Performed by: NURSE PRACTITIONER

## 2024-10-18 RX ORDER — ACETAMINOPHEN 500 MG
1000 TABLET ORAL ONCE
Status: COMPLETED | OUTPATIENT
Start: 2024-10-18 | End: 2024-10-18

## 2024-10-18 RX ORDER — PROCHLORPERAZINE EDISYLATE 5 MG/ML
10 INJECTION INTRAMUSCULAR; INTRAVENOUS ONCE
Status: COMPLETED | OUTPATIENT
Start: 2024-10-18 | End: 2024-10-18

## 2024-10-18 RX ORDER — DEXAMETHASONE SODIUM PHOSPHATE 10 MG/ML
10 INJECTION INTRAMUSCULAR; INTRAVENOUS ONCE
Status: COMPLETED | OUTPATIENT
Start: 2024-10-18 | End: 2024-10-18

## 2024-10-18 RX ORDER — KETOROLAC TROMETHAMINE 15 MG/ML
15 INJECTION, SOLUTION INTRAMUSCULAR; INTRAVENOUS ONCE
Status: COMPLETED | OUTPATIENT
Start: 2024-10-18 | End: 2024-10-18

## 2024-10-18 RX ORDER — SODIUM CHLORIDE 0.9 % (FLUSH) 0.9 %
10 SYRINGE (ML) INJECTION AS NEEDED
Status: DISCONTINUED | OUTPATIENT
Start: 2024-10-18 | End: 2024-10-18 | Stop reason: HOSPADM

## 2024-10-18 RX ORDER — DIPHENHYDRAMINE HYDROCHLORIDE 50 MG/ML
25 INJECTION INTRAMUSCULAR; INTRAVENOUS ONCE
Status: COMPLETED | OUTPATIENT
Start: 2024-10-18 | End: 2024-10-18

## 2024-10-18 RX ADMIN — ACETAMINOPHEN 1000 MG: 500 TABLET, FILM COATED ORAL at 15:53

## 2024-10-18 RX ADMIN — PROCHLORPERAZINE EDISYLATE 10 MG: 5 INJECTION INTRAMUSCULAR; INTRAVENOUS at 15:54

## 2024-10-18 RX ADMIN — KETOROLAC TROMETHAMINE 15 MG: 15 INJECTION, SOLUTION INTRAMUSCULAR; INTRAVENOUS at 16:11

## 2024-10-18 RX ADMIN — DEXAMETHASONE SODIUM PHOSPHATE 10 MG: 10 INJECTION INTRAMUSCULAR; INTRAVENOUS at 16:12

## 2024-10-18 RX ADMIN — DIPHENHYDRAMINE HYDROCHLORIDE 25 MG: 50 INJECTION, SOLUTION INTRAMUSCULAR; INTRAVENOUS at 15:54

## 2024-10-18 NOTE — ED PROVIDER NOTES
"Subjective   History of Present Illness  28-year-old female patient presents to the ED with complaints of a frontal headache \"on top of my head\" for the past week. Initial onset of similar intermittent headaches was September 13, increasing to new onset daily persistent headaches twice daily \"in the morning\", which resolves, with return \"in the afternoon\".  Headaches typically began with frontal (\"top of my head\" throbbing and moves to the entire head. Denies head injury.  Reports sensitivity to light and noise.  She occasionally experiences dizziness and nausea with the headaches. Denies vomiting.  Denies fever and neck pain or stiffness.  Denies sinus pressure, sinus tenderness with palpation, ear pain or fullness, and tinnitus.  Patient has been followed by her PCP ([facility] Smiley) with last visit yesterday. PCP has ordered an MRI Head without, pending insurance approval.  Yesterday, the patient was given samples of both Nurtec and Topamax, which she took without relief.  Patient has been prescribed Fioricet, but has not taken a dose.  Chart review indicates that PCP reports the patient has an increase screen time at work.  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  She does wear glasses last optometry visit 1 year ago. Denies vision changes with the headaches, but reports occasional \"pressure behind my eyes\". No eye pain, redness, or drainage. Does not have a history of HTN, current /71. LNMP \"last week\". She has reported an increase in oral fluid hydration without relief as well.         Review of Systems   Constitutional:  Negative for chills and fever.   HENT:  Negative for ear discharge, ear pain, facial swelling, rhinorrhea, sinus pressure, sinus pain and tinnitus.    Eyes:  Positive for photophobia. Negative for pain, discharge, redness, itching and visual disturbance.        \"Pressure behind eyes\" with " headaches   Gastrointestinal:  Positive for nausea. Negative for vomiting.   Musculoskeletal:  Negative for neck pain and neck stiffness.   Neurological:  Positive for dizziness and headaches. Negative for seizures, syncope, facial asymmetry, speech difficulty and weakness.   Hematological:  Negative for adenopathy.   All other systems reviewed and are negative.      Past Medical History:   Diagnosis Date    GERD (gastroesophageal reflux disease)        No Known Allergies    Past Surgical History:   Procedure Laterality Date    HEAD/NECK LESION/CYST EXCISION Left 11/12/2020    Procedure: Excision of lesion of the left jawline with linear closure;  Surgeon: David Aleman MD;  Location: [facility];  Service: ENT;  Laterality: Left;    KIDNEY SURGERY Left     partial    MYRINGOTOMY W/ TUBES         Family History   Problem Relation Age of Onset    Diabetes Father     Hyperlipidemia Father     Skin cancer Father        Social History     Socioeconomic History    Marital status:    Tobacco Use    Smoking status: Never    Smokeless tobacco: Never   Vaping Use    Vaping status: Never Used   Substance and Sexual Activity    Alcohol use: Yes     Comment: occ    Drug use: Never    Sexual activity: Yes     Partners: Male     Birth control/protection: None           Objective   Physical Exam  Vitals and nursing note reviewed.   Constitutional:       General: She is awake. She is not in acute distress.     Appearance: She is not ill-appearing, toxic-appearing or diaphoretic.   HENT:      Head: Normocephalic and atraumatic.      Jaw: There is normal jaw occlusion.      Right Ear: Tympanic membrane, ear canal and external ear normal. No drainage.      Left Ear: Tympanic membrane, ear canal and external ear normal. No drainage.      Nose: Nose normal.      Right Sinus: No maxillary sinus tenderness or frontal sinus tenderness.      Left Sinus: No maxillary sinus tenderness or frontal sinus tenderness.      Mouth/Throat:       Lips: Pink.      Mouth: Mucous membranes are moist.      Tongue: Tongue does not deviate from midline.      Palate: No lesions.      Pharynx: Oropharynx is clear. Uvula midline. No posterior oropharyngeal erythema.      Tonsils: No tonsillar exudate.   Eyes:      General: Lids are normal. Gaze aligned appropriately.         Right eye: No discharge.         Left eye: No discharge.      Extraocular Movements: Extraocular movements intact.      Conjunctiva/sclera: Conjunctivae normal.      Pupils: Pupils are equal, round, and reactive to light.   Neck:      Trachea: Trachea and phonation normal.   Cardiovascular:      Rate and Rhythm: Normal rate and regular rhythm.      Heart sounds: Normal heart sounds.   Pulmonary:      Effort: Pulmonary effort is normal. No respiratory distress.      Breath sounds: Normal breath sounds.   Musculoskeletal:      Cervical back: Full passive range of motion without pain, normal range of motion and neck supple.      Comments: Moves all extremities equally and independently    Lymphadenopathy:      Cervical: No cervical adenopathy.   Skin:     General: Skin is warm and dry.      Capillary Refill: Capillary refill takes less than 2 seconds.   Neurological:      General: No focal deficit present.      Mental Status: She is alert and oriented to person, place, and time.      GCS: GCS eye subscore is 4. GCS verbal subscore is 5. GCS motor subscore is 6.      Cranial Nerves: Cranial nerves 2-12 are intact.      Sensory: Sensation is intact.      Motor: Motor function is intact.      Coordination: Finger-Nose-Finger Test normal.   Psychiatric:         Mood and Affect: Mood normal.         Behavior: Behavior normal. Behavior is cooperative.         Procedures           ED Course  ED Course as of 10/21/24 0519   Fri Oct 18, 2024   1624 CBC & Differential(!)  Unremarkable [TD]   1624 Comprehensive Metabolic Panel  All values within normal limits [TD]   1624 Magnesium  2.1, normal [TD]   1624  hCG, Serum, Qualitative  Negative [TD]   1624 C-reactive Protein(!)  0.84, elevated [TD]   1625 CT Head Without Contrast  IMPRESSION:  Impression:       No acute intracranial abnormality.   [TD]   1715 To bedside to speak with patient. Updated on results. Current head pain 0/10. UA pending. Patient does not want to wait until UA resulted. Denies dysuria, urinary changes, and UTI symptoms. Ordered to determine if dehydration could be a cause of headaches.     Consulted ED attending (Ahmet). Plan: f/u with PCP, discuss obtaining neurology referral and/or referral to headache clinic.  [TD]      ED Course User Index  [TD] Indu Marcano APRN                                             Medical Decision Making  Course of [place] in the ED:  Labs Reviewed  C-REACTIVE PROTEIN - Abnormal; Notable for the following components:     C-Reactive Protein            0.84 (*)            All other components within normal limits  CBC WITH AUTO DIFFERENTIAL - Abnormal; Notable for the following components:     MCV                           78.6 (*)               MCH                           25.6 (*)            All other components within normal limits  HCG, SERUM, QUALITATIVE - Normal  MAGNESIUM - Normal  COMPREHENSIVE METABOLIC PANEL         Narrative: GFR Normal >60                  Chronic Kidney Disease <60                  Kidney Failure <15                    CBC AND DIFFERENTIAL    Medications  prochlorperazine (COMPAZINE) injection 10 mg (10 mg Intravenous Given 10/18/24 1554)  diphenhydrAMINE (BENADRYL) injection 25 mg (25 mg Intravenous Given 10/18/24 1554)  acetaminophen (TYLENOL) tablet 1,000 mg (1,000 mg Oral Given 10/18/24 1553)  dexAMETHasone (DECADRON) injection 10 mg (10 mg Intravenous Given 10/18/24 1612)  ketorolac (TORADOL) injection 15 mg (15 mg Intravenous Given 10/18/24 1611)    CT Head Without Contrast   Final Result    Impression:           No acute intracranial abnormality.         This report was signed and  finalized on 10/18/2024 3:40 PM by Regino Jim.              Problems Addressed:  Frontal headache: complicated acute illness or injury  New persistent daily headache: complicated acute illness or injury    Amount and/or Complexity of Data Reviewed  Labs: ordered. Decision-making details documented in ED Course.  Radiology: ordered. Decision-making details documented in ED Course.    Risk  OTC drugs.  Prescription drug management.        Final diagnoses:   New persistent daily headache   Frontal headache       ED Disposition  ED Disposition       ED Disposition   Discharge    Condition   Stable    Comment   --               Jessica Smiley, APRN  [address] [place] [id number] [phone]      As needed and as scheduled.  Discussed possible referral to neurology and/or headache clinic    [facility] EMERGENCY DEPARTMENT  [address] [place], [place] [address] [place] [address] [phone]    As needed, If not improving and sooner if worsening         Medication List      No changes were made to your prescriptions during this visit.          understanding of the discharge instructions provided. Patient also verbalized understanding to follow-up as recommended, and to return to the ER if unable to follow-up with worsening or worrisome symptoms.       Problems Addressed:  Frontal headache: acute illness or injury  New persistent daily headache: complicated acute illness or injury    Amount and/or Complexity of Data Reviewed  External Data Reviewed: notes.  Labs: ordered. Decision-making details documented in ED Course.  Radiology: ordered. Decision-making details documented in ED Course.    Risk  OTC drugs.  Prescription drug management.        Final diagnoses:   New persistent daily headache   Frontal headache       ED Disposition  ED Disposition       ED Disposition   Discharge    Condition   Stable    Comment   --               Jessica Smiley, APRN  [address] [place] [id number] [phone]      As needed and as scheduled.  Discussed possible referral to neurology and/or headache clinic    [facility] EMERGENCY DEPARTMENT  [address] [place] [id number] [phone]    As needed, If not improving and sooner if worsening         Medication List      No changes were made to your prescriptions during this visit.            Indu Marcano APRN  10/22/24 [id number]       Indu Marcano APRN  10/22/24 [id number]

## 2024-10-18 NOTE — Clinical Note
[facility] EMERGENCY DEPARTMENT  [address] [place] [address] AVE  [facility] [address]  Phone: [phone]    Adelaida Blair was seen and treated in our emergency department on 10/18/2024.  She may return to work on 10/21/2024.         Thank you for choosing [facility].    Indu Marcano APRN

## 2024-10-18 NOTE — DISCHARGE INSTRUCTIONS
It was very nice to meet you. Thank you for allowing us to take care of you today at [facility].     Home to rest.  Activity as tolerated.  Continue current medications as prescribed.  Continue to stay well-hydrated with noncaffeinated fluids to prevent dehydration.  Occasionally, headaches do respond to some caffeine; with headaches, you may incorporate this into your diet to see if it helps resolve your headaches.  Try taking the Fioricet previously prescribed to determine if it is effective for your headaches.  Follow-up with your primary care provider as needed and as scheduled.  As discussed, discussed referral to neurology due to length of time it sometimes takes to obtain referral and/or referral to the headache clinic.  Return to the ER as needed with worsening or worrisome symptoms.    Today you were seen in the emergency department for your symptoms. Please understand that an ER evaluation is just the start of your evaluation. We do the best we can, but we are often unable to fully find what is causing your symptoms from this single evaluation.  Because of this, the goal is to determine whether you need to be admitted to the hospital or if it is safe for you to go home and follow-up with your other health care providers such as your primary care provider physicians or a specialist on an outpatient basis.      Like we discussed, I strongly urge that you follow up with your primary care provider. Please call their office to set up an appointment as soon as possible so that you can be re-evaluated for your symptoms or for any other questions.  I have provided the information needed, including phone number, to call to set up an appointment below in these discharge papers.      Educational material has also been provided in the following pages regarding what we have discussed today.      Please return to the emergency room within 12-48 hours if you experience symptoms such as the following:   Fever,  chills, chest pain or shortness of breath, pain with inspiration/expiration, pain that travels to your arms, neck or back, nausea, vomiting, severe headache, tearing pain in your chest, dizziness, feel as though you are about to pass out, OR if you have any worsening symptoms, or any other concerns.

## 2024-11-08 DIAGNOSIS — M79.641 PAIN IN BOTH HANDS: ICD-10-CM

## 2024-11-08 DIAGNOSIS — M79.642 PAIN IN BOTH HANDS: ICD-10-CM

## 2024-11-12 DIAGNOSIS — J30.9 ALLERGIC RHINITIS, UNSPECIFIED SEASONALITY, UNSPECIFIED TRIGGER: Primary | ICD-10-CM

## 2024-11-12 RX ORDER — FLUTICASONE PROPIONATE 50 MCG
2 SPRAY, SUSPENSION (ML) NASAL DAILY
Qty: 16 G | Refills: 2 | Status: SHIPPED | OUTPATIENT
Start: 2024-11-12

## 2025-04-08 DIAGNOSIS — Z00.00 WELLNESS EXAMINATION: ICD-10-CM

## 2025-04-08 DIAGNOSIS — E55.9 VITAMIN D INSUFFICIENCY: Primary | ICD-10-CM

## 2025-04-09 LAB
25(OH)D3+25(OH)D2 SERPL-MCNC: 47.7 NG/ML (ref 30–100)
ALBUMIN SERPL-MCNC: 4.2 G/DL (ref 3.5–5.2)
ALBUMIN/GLOB SERPL: 1.6 G/DL
ALP SERPL-CCNC: 56 U/L (ref 39–117)
ALT SERPL-CCNC: 14 U/L (ref 1–33)
APPEARANCE UR: CLEAR
AST SERPL-CCNC: 14 U/L (ref 1–32)
BACTERIA #/AREA URNS HPF: ABNORMAL /HPF
BASOPHILS # BLD AUTO: 0.03 10*3/MM3 (ref 0–0.2)
BASOPHILS NFR BLD AUTO: 0.4 % (ref 0–1.5)
BILIRUB SERPL-MCNC: 0.3 MG/DL (ref 0–1.2)
BILIRUB UR QL STRIP: NEGATIVE
BUN SERPL-MCNC: 13 MG/DL (ref 6–20)
BUN/CREAT SERPL: 19.1 (ref 7–25)
CALCIUM SERPL-MCNC: 9.5 MG/DL (ref 8.6–10.5)
CASTS URNS MICRO: ABNORMAL
CHLORIDE SERPL-SCNC: 103 MMOL/L (ref 98–107)
CHOLEST SERPL-MCNC: 186 MG/DL (ref 0–200)
CO2 SERPL-SCNC: 24.3 MMOL/L (ref 22–29)
COLOR UR: YELLOW
CREAT SERPL-MCNC: 0.68 MG/DL (ref 0.57–1)
EGFRCR SERPLBLD CKD-EPI 2021: 120.3 ML/MIN/1.73
EOSINOPHIL # BLD AUTO: 0.15 10*3/MM3 (ref 0–0.4)
EOSINOPHIL NFR BLD AUTO: 2.2 % (ref 0.3–6.2)
EPI CELLS #/AREA URNS HPF: ABNORMAL /HPF
ERYTHROCYTE [DISTWIDTH] IN BLOOD BY AUTOMATED COUNT: 14.7 % (ref 12.3–15.4)
GLOBULIN SER CALC-MCNC: 2.6 GM/DL
GLUCOSE SERPL-MCNC: 81 MG/DL (ref 65–99)
GLUCOSE UR QL STRIP: NEGATIVE
HCT VFR BLD AUTO: 41.7 % (ref 34–46.6)
HDLC SERPL-MCNC: 50 MG/DL (ref 40–60)
HGB BLD-MCNC: 13.4 G/DL (ref 12–15.9)
HGB UR QL STRIP: NEGATIVE
IMM GRANULOCYTES # BLD AUTO: 0.01 10*3/MM3 (ref 0–0.05)
IMM GRANULOCYTES NFR BLD AUTO: 0.1 % (ref 0–0.5)
KETONES UR QL STRIP: NEGATIVE
LDLC SERPL CALC-MCNC: 120 MG/DL (ref 0–100)
LEUKOCYTE ESTERASE UR QL STRIP: NEGATIVE
LYMPHOCYTES # BLD AUTO: 1.98 10*3/MM3 (ref 0.7–3.1)
LYMPHOCYTES NFR BLD AUTO: 29.5 % (ref 19.6–45.3)
MCH RBC QN AUTO: 24.9 PG (ref 26.6–33)
MCHC RBC AUTO-ENTMCNC: 32.1 G/DL (ref 31.5–35.7)
MCV RBC AUTO: 77.5 FL (ref 79–97)
MONOCYTES # BLD AUTO: 0.56 10*3/MM3 (ref 0.1–0.9)
MONOCYTES NFR BLD AUTO: 8.3 % (ref 5–12)
NEUTROPHILS # BLD AUTO: 3.99 10*3/MM3 (ref 1.7–7)
NEUTROPHILS NFR BLD AUTO: 59.5 % (ref 42.7–76)
NITRITE UR QL STRIP: NEGATIVE
NRBC BLD AUTO-RTO: 0 /100 WBC (ref 0–0.2)
PH UR STRIP: 7.5 [PH] (ref 5–8)
PLATELET # BLD AUTO: 338 10*3/MM3 (ref 140–450)
POTASSIUM SERPL-SCNC: 5 MMOL/L (ref 3.5–5.2)
PROT SERPL-MCNC: 6.8 G/DL (ref 6–8.5)
PROT UR QL STRIP: ABNORMAL
RBC # BLD AUTO: 5.38 10*6/MM3 (ref 3.77–5.28)
RBC #/AREA URNS HPF: ABNORMAL /HPF
SODIUM SERPL-SCNC: 138 MMOL/L (ref 136–145)
SP GR UR STRIP: 1.02 (ref 1–1.03)
TRIGL SERPL-MCNC: 86 MG/DL (ref 0–150)
TSH SERPL DL<=0.005 MIU/L-ACNC: 1.02 UIU/ML (ref 0.27–4.2)
UROBILINOGEN UR STRIP-MCNC: ABNORMAL MG/DL
VLDLC SERPL CALC-MCNC: 16 MG/DL (ref 5–40)
WBC # BLD AUTO: 6.72 10*3/MM3 (ref 3.4–10.8)
WBC #/AREA URNS HPF: ABNORMAL /HPF

## 2025-04-10 LAB
IRON SATN MFR SERPL: 12 % (ref 20–50)
IRON SERPL-MCNC: 62 MCG/DL (ref 37–145)
TIBC SERPL-MCNC: 514 MCG/DL
UIBC SERPL-MCNC: 452 MCG/DL (ref 112–346)
WRITTEN AUTHORIZATION: NORMAL

## 2025-04-11 ENCOUNTER — OFFICE VISIT (OUTPATIENT)
Dept: INTERNAL MEDICINE | Facility: CLINIC | Age: 30
End: 2025-04-11
Payer: COMMERCIAL

## 2025-04-11 VITALS
DIASTOLIC BLOOD PRESSURE: 68 MMHG | OXYGEN SATURATION: 98 % | WEIGHT: 169.6 LBS | HEART RATE: 53 BPM | SYSTOLIC BLOOD PRESSURE: 102 MMHG | HEIGHT: 63 IN | BODY MASS INDEX: 30.05 KG/M2 | TEMPERATURE: 97.4 F

## 2025-04-11 DIAGNOSIS — E55.9 VITAMIN D INSUFFICIENCY: ICD-10-CM

## 2025-04-11 DIAGNOSIS — Z00.00 ANNUAL PHYSICAL EXAM: Primary | ICD-10-CM

## 2025-04-11 DIAGNOSIS — E78.00 ELEVATED LDL CHOLESTEROL LEVEL: ICD-10-CM

## 2025-04-11 DIAGNOSIS — E66.811 CLASS 1 OBESITY: ICD-10-CM

## 2025-04-11 DIAGNOSIS — K21.9 GASTROESOPHAGEAL REFLUX DISEASE, UNSPECIFIED WHETHER ESOPHAGITIS PRESENT: ICD-10-CM

## 2025-04-11 PROCEDURE — 99395 PREV VISIT EST AGE 18-39: CPT

## 2025-04-11 NOTE — PROGRESS NOTES
Subjective   Adelaida Blair is a 29 y.o. female.   Chief Complaint   Patient presents with    Annual Exam     Labs in chart    Tongue Lesions     Patient c/o tongue lesions x unsure -  Denies tenderness, bleeding.       History of Present Illness   History of Present Illness  The patient is a 29-year-old female who presents to the office today for her annual physical.    She reports overall good health but expresses concern about asymptomatic spots on the tip of her tongue, first noticed by her mother a few weeks ago. She suspects these spots may have been present for some time. She has not sought dental care since her last visit approximately 1 to 1.5 years ago. She frequently experiences tongue burns due to hot food consumption but does not believe this is related to the spots. She reports no alterations in taste or sensation at the tip of her tongue.    She has initiated vitamin D supplementation due to a slight deficiency. She does not currently take any multivitamins and primarily consumes protein drinks, with a limited intake of meat during the week but increased consumption over the weekend. She has been making dietary modifications, including increased fruit and vegetable intake, and has recently incorporated krill oil into her regimen, which she reports has alleviated her back pain. She is attempting to increase her physical activity, although without a consistent routine, and has been walking 2 to 3 times per week.    She anticipates the onset of her menstrual cycle within the next few days. She reports no urinary symptoms such as burning, increased frequency, or urgency, and no flank pain. She has observed a correlation between excessive caffeine intake and the development of urinary tract infections, leading her to moderate her caffeine consumption. She acknowledges inadequate water intake and is making efforts to improve this.    She continues to experience intermittent constipation.    Supplemental  Information  She had tubes in both ears as a child.    SOCIAL HISTORY  She works in a pharmacy.    FAMILY HISTORY  Her sister has had iron deficiency.    MEDICATIONS  Current: vitamin D, krill oil       The following portions of the patient's history were reviewed and updated as appropriate: allergies, current medications, past family history, past medical history, past social history, past surgical history and problem list.    Review of Systems    Objective   Past Medical History:   Diagnosis Date    GERD (gastroesophageal reflux disease)       Past Surgical History:   Procedure Laterality Date    HEAD/NECK LESION/CYST EXCISION Left 11/12/2020    Procedure: Excision of lesion of the left jawline with linear closure;  Surgeon: David Aleman MD;  Location: [facility];  Service: ENT;  Laterality: Left;    KIDNEY SURGERY Left     partial    MYRINGOTOMY W/ TUBES          Current Outpatient Medications:     butalbital-acetaminophen-caffeine (FIORICET, ESGIC) -40 MG per tablet, Take 1 tablet by mouth Every 12 (Twelve) Hours As Needed for Headache or Migraine., Disp: 5 tablet, Rfl: 0    cetirizine (zyrTEC) 10 MG tablet, Take 1 tablet by mouth Daily., Disp: 90 tablet, Rfl: 3    Diclofenac Sodium (Voltaren) 1 % gel gel, Apply 4 g topically to the appropriate area as directed 4 (Four) Times a Day As Needed (joint pain)., Disp: 300 g, Rfl: 3    fluticasone (FLONASE) 50 MCG/ACT nasal spray, Administer 2 sprays into the nostril(s) as directed by provider Daily., Disp: 16 g, Rfl: 2    ondansetron ODT (ZOFRAN-ODT) 4 MG disintegrating tablet, Place 1 tablet on the tongue Every 8 (Eight) Hours As Needed for Nausea or Vomiting., Disp: 30 tablet, Rfl: 0    Rimegepant Sulfate (NURTEC) 75 MG tablet dispersible tablet, Take 1 tablet by mouth Daily As Needed (Migraine/headache)., Disp: 30 tablet, Rfl: 1    topiramate (TOPAMAX) 50 MG tablet, Take 1 tablet by mouth 2 (Two) Times a Day., Disp: 60 tablet, Rfl: 0    vitamin D  "(ERGOCALCIFEROL) 1.25 MG (06396 UT) capsule capsule, Take 1 capsule by mouth 1 (One) Time Per Week., Disp: 12 capsule, Rfl: 1    omeprazole OTC (PrilOSEC OTC) 20 MG EC tablet, Take 1 tablet by mouth Every Morning. (Patient not taking: Reported on 4/11/2025), Disp: 14 tablet, Rfl: 0      /68 (BP Location: Left arm, Patient Position: Sitting, Cuff Size: Adult)   Pulse 53   Temp 97.4 °F (36.3 °C) (Temporal)   Ht 160 cm (63\")   Wt 76.9 kg (169 lb 9.6 oz)   LMP 03/14/2025   SpO2 98%   BMI 30.04 kg/m²      Body mass index is 30.04 kg/m².  BMI is >= 30 and <35. (Class 1 Obesity). The following options were offered after discussion;: exercise counseling/recommendations and nutrition counseling/recommendations       Physical Exam  Vitals and nursing note reviewed.   Constitutional:       General: She is not in acute distress.     Appearance: Normal appearance. She is obese. She is not ill-appearing, toxic-appearing or diaphoretic.   HENT:      Head: Normocephalic and atraumatic.      Right Ear: Ear canal and external ear normal. There is no impacted cerumen. Tympanic membrane is scarred.      Left Ear: Ear canal and external ear normal. There is no impacted cerumen. Tympanic membrane is scarred.      Nose: Nose normal.      Mouth/Throat:      Mouth: Mucous membranes are moist.      Tongue: Lesions (tiny dark red/hypopigmented papilla on the tip of the tougue -no pain, no evidence of swelling or other oral abnormality.) present.      Pharynx: Oropharynx is clear. No oropharyngeal exudate or posterior oropharyngeal erythema.   Eyes:      Extraocular Movements: Extraocular movements intact.      Conjunctiva/sclera: Conjunctivae normal.      Pupils: Pupils are equal, round, and reactive to light.   Neck:      Thyroid: No thyroid mass, thyromegaly or thyroid tenderness.   Cardiovascular:      Rate and Rhythm: Normal rate and regular rhythm.      Pulses: Normal pulses.      Heart sounds: Normal heart sounds. "   Pulmonary:      Effort: Pulmonary effort is normal.      Breath sounds: Normal breath sounds.   Abdominal:      General: Bowel sounds are normal.      Palpations: Abdomen is soft.   Musculoskeletal:         General: Normal range of motion.      Cervical back: Normal range of motion and neck supple.      Right lower leg: No edema.      Left lower leg: No edema.   Skin:     General: Skin is warm and dry.   Neurological:      General: No focal deficit present.      Mental Status: She is alert and oriented to person, place, and time. Mental status is at baseline.      Gait: Gait normal.   Psychiatric:         Mood and Affect: Mood normal.         Behavior: Behavior normal.         Thought Content: Thought content normal.         Judgment: Judgment normal.               Assessment & Plan   Diagnoses and all orders for this visit:    1. Annual physical exam (Primary)    2. Vitamin D insufficiency    3. Gastroesophageal reflux disease, unspecified whether esophagitis present    4. Elevated LDL cholesterol level    5. Class 1 obesity                 Assessment & Plan  1. Tongue lesions.  The etiology of the tongue lesions remains uncertain, with potential causes including trauma or nutritional deficiencies. The possibility of malignancy is considered low. A photograph of the tongue will be taken for further dermatological consultation. She will be informed of any significant findings via MyChart or text message. She is advised to monitor the lesions for any changes in size, spread, or other alterations in her oral cavity.    2. Iron deficiency.  Her red blood cell count is elevated, which could be a compensatory response to her mild iron deficiency. However, her iron profile does not indicate a severe deficiency.  Ferritin was ordered has not resulted yet.  She is advised to incorporate more iron-rich foods into her diet, such as legumes and leafy green vegetables, and consider taking a prenatal multivitamin with  iron.    3. Interstitial cystitis.  Her urinalysis revealed microscopic hematuria and bacteriuria, no white blood cells.  She has no concerning urinary symptoms today but does indicate that if she consumes caffeine more consistently she experiences UTI type symptoms - this could suggest susceptibility to interstitial cystitis, where the bladder lining is sensitive to certain substances, leading to inflammation without infection. She is advised to maintain adequate hydration and limit caffeine intake. If she observes hematuria outside of her menstrual period, she should inform us.    4. Constipation.  She is advised to take Metamucil gummies daily to help regulate her bowel movements.    5. Health maintenance.  Her vitamin D level is slightly below the optimal range, but not concerning continue with current supplement. Her cholesterol levels have increased slightly compared to the previous year, with a total cholesterol level below 200, normal triglycerides, HDL at 50, and LDL at 120. Her thyroid function is within normal limits. She is advised to continue her vitamin D supplementation and spend time outdoors in sunlight. She is also advised to avoid unhealthy oils and fats, and maintain regular physical activity.    Patient or patient representative verbalized consent for the use of Ambient Listening during the visit with  ROB Tristan for chart documentation. 4/11/2025  09:37 CDT    Please note that portions of this note were completed with a voice recognition program.     Electronically signed by ROB Tristan, 04/11/25, 09:39 CDT.

## 2025-05-20 ENCOUNTER — OFFICE VISIT (OUTPATIENT)
Dept: INTERNAL MEDICINE | Facility: CLINIC | Age: 30
End: 2025-05-20
Payer: COMMERCIAL

## 2025-05-20 VITALS
DIASTOLIC BLOOD PRESSURE: 78 MMHG | HEART RATE: 74 BPM | HEIGHT: 63 IN | TEMPERATURE: 97.7 F | SYSTOLIC BLOOD PRESSURE: 108 MMHG | OXYGEN SATURATION: 97 % | BODY MASS INDEX: 29.59 KG/M2 | WEIGHT: 167 LBS

## 2025-05-20 DIAGNOSIS — S16.1XXD STRAIN OF NECK MUSCLE, SUBSEQUENT ENCOUNTER: ICD-10-CM

## 2025-05-20 DIAGNOSIS — R51.9 UNILATERAL OCCIPITAL HEADACHE: Primary | ICD-10-CM

## 2025-05-20 NOTE — PROGRESS NOTES
Subjective   Adelaida Blair is a 29 y.o. female.   Chief Complaint   Patient presents with    Soreness     Patient c/o intermittent head soreness x months.   Experiencing tightness.        History of Present Illness   History of Present Illness  The patient presents to the office today with complaints of head soreness, likely due to stress in the hospital.    She reports experiencing intermittent head soreness, which she believes is triggered by intense emotional responses such as excessive laughter or crying. The onset of these symptoms was approximately 6 months ago, following an episode of prolonged laughter. She describes the sensation as a combination of soreness and tightness in the posterior aspect of her head. About a month ago, she experienced a headache accompanied by the same posterior head soreness. On 05/12/2025, she had a headache localized to the top of her head, unrelated to the posterior head soreness. This soreness persisted for two days. No associated swelling, vision changes, or nausea are reported. She also reports no neck pain or tension. Occasionally, she experiences a pulsating sensation in the area of soreness.    She maintains that she is not well hydrated. She has been taking a daily magnesium supplement, which has not resulted in any noticeable changes. She has attempted to manage her symptoms with over-the-counter analgesics, but these have only been effective in relieving her headaches, not the posterior head soreness. She has not tried Tylenol as it has been ineffective for her in the past. She reports no history of whiplash injuries from motor vehicle accidents or roller coaster rides. No palpitations, hearing changes, fevers, night sweats, insect bites, dizziness, or lightheadedness are reported. She occasionally gags while brushing her tongue. She has considered trying cyclobenzaprine, a muscle relaxant, but has not yet done so.    She has been working at [employer] and has been  getting up early to make dinner, which has led to some sleep deprivation. She has not noticed if anger triggers her symptoms, but raising her voice does seem to exacerbate them. Speaking softly or whispering provides some relief.       The following portions of the patient's history were reviewed and updated as appropriate: allergies, current medications, past family history, past medical history, past social history, past surgical history and problem list.    Review of Systems    Objective   Past Medical History:   Diagnosis Date    GERD (gastroesophageal reflux disease)       Past Surgical History:   Procedure Laterality Date    HEAD/NECK LESION/CYST EXCISION Left 11/12/2020    Procedure: Excision of lesion of the left jawline with linear closure;  Surgeon: David Aleman MD;  Location: [facility];  Service: ENT;  Laterality: Left;    KIDNEY SURGERY Left     partial    MYRINGOTOMY W/ TUBES          Current Outpatient Medications:     butalbital-acetaminophen-caffeine (FIORICET, ESGIC) -40 MG per tablet, Take 1 tablet by mouth Every 12 (Twelve) Hours As Needed for Headache or Migraine., Disp: 5 tablet, Rfl: 0    cetirizine (zyrTEC) 10 MG tablet, Take 1 tablet by mouth Daily., Disp: 90 tablet, Rfl: 3    Diclofenac Sodium (Voltaren) 1 % gel gel, Apply 4 g topically to the appropriate area as directed 4 (Four) Times a Day As Needed (joint pain)., Disp: 300 g, Rfl: 3    fluticasone (FLONASE) 50 MCG/ACT nasal spray, Administer 2 sprays into the nostril(s) as directed by provider Daily., Disp: 16 g, Rfl: 2    omeprazole OTC (PrilOSEC OTC) 20 MG EC tablet, Take 1 tablet by mouth Every Morning., Disp: 14 tablet, Rfl: 0    ondansetron ODT (ZOFRAN-ODT) 4 MG disintegrating tablet, Place 1 tablet on the tongue Every 8 (Eight) Hours As Needed for Nausea or Vomiting., Disp: 30 tablet, Rfl: 0    Rimegepant Sulfate (NURTEC) 75 MG tablet dispersible tablet, Take 1 tablet by mouth Daily As Needed (Migraine/headache)., Disp:  "30 tablet, Rfl: 1    topiramate (TOPAMAX) 50 MG tablet, Take 1 tablet by mouth 2 (Two) Times a Day., Disp: 60 tablet, Rfl: 0    vitamin D3 125 MCG (5000 UT) capsule capsule, Take 1 capsule by mouth Daily., Disp: 90 capsule, Rfl: 3      /78 (BP Location: Left arm, Patient Position: Sitting, Cuff Size: Adult)   Pulse 74   Temp 97.7 °F (36.5 °C) (Temporal)   Ht 160 cm (63\")   Wt 75.8 kg (167 lb)   LMP 05/09/2025   SpO2 97%   BMI 29.58 kg/m²      Body mass index is 29.58 kg/m².          Physical Exam  Vitals and nursing note reviewed.   Constitutional:       General: She is not in acute distress.     Appearance: Normal appearance. She is not ill-appearing, toxic-appearing or diaphoretic.   HENT:      Head: Normocephalic and atraumatic.        Comments: Locality of pain when it occurs  Eyes:      Extraocular Movements: Extraocular movements intact.      Conjunctiva/sclera: Conjunctivae normal.      Pupils: Pupils are equal, round, and reactive to light.   Cardiovascular:      Rate and Rhythm: Normal rate and regular rhythm.      Pulses: Normal pulses.      Heart sounds: Normal heart sounds.   Pulmonary:      Effort: Pulmonary effort is normal.      Breath sounds: Normal breath sounds.   Abdominal:      General: Bowel sounds are normal.      Palpations: Abdomen is soft.   Musculoskeletal:         General: Normal range of motion.      Cervical back: Normal range of motion and neck supple. No rigidity or tenderness.   Lymphadenopathy:      Cervical: No cervical adenopathy.   Skin:     General: Skin is warm and dry.   Neurological:      General: No focal deficit present.      Mental Status: She is alert and oriented to person, place, and time. Mental status is at baseline.      Motor: No weakness.      Gait: Gait normal.   Psychiatric:         Mood and Affect: Mood normal.         Behavior: Behavior normal.         Thought Content: Thought content normal.         Judgment: Judgment normal. "               Assessment & Plan   Diagnoses and all orders for this visit:    1. Unilateral occipital headache (Primary)    2. Strain of neck muscle, subsequent encounter                 Assessment & Plan  1. Head soreness.  - The symptoms suggest a musculoskeletal origin, potentially linked to increased intracranial pressure during episodes of intense laughter.  - There is no evidence of neck-related issues or mastoid effusions. Blood pressure readings have consistently been within normal limits.  - The possibility of a tick-borne illness is low given the onset of symptoms in 11/2024. She is advised to maintain adequate hydration and incorporate daily neck stretching exercises into her routine.  - She may consider using cyclobenzaprine at bedtime for a few consecutive nights to assess its effectiveness in reducing symptoms. If beneficial and without adverse effects, she may continue its use. She is also encouraged to try Tylenol for symptom management and to monitor for any changes in frequency or severity of symptoms. If there are any additional changes, associated symptoms, increasing severity, or more frequent occurrences, she should inform the provider.  - Encouraged to incorporate daily neck stretching exercises, information/instructions included in AVS.    Patient or patient representative verbalized consent for the use of Ambient Listening during the visit with  ROB Tristan for chart documentation. 5/20/2025  16:36 CDT    Please note that portions of this note were completed with a voice recognition program.     Electronically signed by ROB Tristan, 05/20/25, 16:37 CDT.

## 2025-08-14 ENCOUNTER — HOSPITAL ENCOUNTER (OUTPATIENT)
Dept: GENERAL RADIOLOGY | Facility: HOSPITAL | Age: 30
Discharge: HOME | End: 2025-08-14
Payer: COMMERCIAL

## 2025-08-14 ENCOUNTER — OFFICE VISIT (OUTPATIENT)
Dept: INTERNAL MEDICINE | Facility: CLINIC | Age: 30
End: 2025-08-14
Payer: COMMERCIAL

## 2025-08-14 VITALS
DIASTOLIC BLOOD PRESSURE: 80 MMHG | HEIGHT: 63 IN | BODY MASS INDEX: 28.63 KG/M2 | WEIGHT: 161.6 LBS | SYSTOLIC BLOOD PRESSURE: 122 MMHG

## 2025-08-14 DIAGNOSIS — E66.3 OVERWEIGHT (BMI 25.0-29.9): ICD-10-CM

## 2025-08-14 DIAGNOSIS — M76.899 QUADRICEPS TENDINITIS: Primary | ICD-10-CM

## 2025-08-14 DIAGNOSIS — M54.81 OCCIPITAL NEURALGIA OF LEFT SIDE: ICD-10-CM

## 2025-08-14 DIAGNOSIS — N92.1 MENORRHAGIA WITH IRREGULAR CYCLE: ICD-10-CM

## 2025-08-14 DIAGNOSIS — R42 DIZZINESS: ICD-10-CM

## 2025-08-14 DIAGNOSIS — M25.561 ANTERIOR KNEE PAIN, RIGHT: ICD-10-CM

## 2025-08-14 DIAGNOSIS — M25.661 DECREASED RANGE OF MOTION OF RIGHT KNEE: ICD-10-CM

## 2025-08-14 PROCEDURE — 73560 X-RAY EXAM OF KNEE 1 OR 2: CPT

## 2025-08-14 RX ORDER — METHOCARBAMOL 500 MG/1
500 TABLET, FILM COATED ORAL 2 TIMES DAILY PRN
Qty: 30 TABLET | Refills: 0 | Status: SHIPPED | OUTPATIENT
Start: 2025-08-14

## 2025-08-15 LAB
ALBUMIN SERPL-MCNC: 4.4 G/DL (ref 4–5)
ALP SERPL-CCNC: 55 IU/L (ref 44–121)
ALT SERPL-CCNC: 10 IU/L (ref 0–32)
AST SERPL-CCNC: 14 IU/L (ref 0–40)
BASOPHILS # BLD AUTO: 0 X10E3/UL (ref 0–0.2)
BASOPHILS NFR BLD AUTO: 1 %
BILIRUB SERPL-MCNC: 0.4 MG/DL (ref 0–1.2)
BUN SERPL-MCNC: 14 MG/DL (ref 6–20)
BUN/CREAT SERPL: 20 (ref 9–23)
CALCIUM SERPL-MCNC: 9.3 MG/DL (ref 8.7–10.2)
CHLORIDE SERPL-SCNC: 103 MMOL/L (ref 96–106)
CO2 SERPL-SCNC: 20 MMOL/L (ref 20–29)
CREAT SERPL-MCNC: 0.69 MG/DL (ref 0.57–1)
EGFRCR SERPLBLD CKD-EPI 2021: 120 ML/MIN/1.73
EOSINOPHIL # BLD AUTO: 0.1 X10E3/UL (ref 0–0.4)
EOSINOPHIL NFR BLD AUTO: 2 %
ERYTHROCYTE [DISTWIDTH] IN BLOOD BY AUTOMATED COUNT: 15.2 % (ref 11.7–15.4)
FERRITIN SERPL-MCNC: 14 NG/ML (ref 15–150)
GLOBULIN SER CALC-MCNC: 2.4 G/DL (ref 1.5–4.5)
GLUCOSE SERPL-MCNC: 89 MG/DL (ref 70–99)
HCT VFR BLD AUTO: 42.4 % (ref 34–46.6)
HGB BLD-MCNC: 13 G/DL (ref 11.1–15.9)
IMM GRANULOCYTES # BLD AUTO: 0 X10E3/UL (ref 0–0.1)
IMM GRANULOCYTES NFR BLD AUTO: 0 %
IRON SATN MFR SERPL: 10 % (ref 15–55)
IRON SERPL-MCNC: 43 UG/DL (ref 27–159)
LYMPHOCYTES # BLD AUTO: 1.7 X10E3/UL (ref 0.7–3.1)
LYMPHOCYTES NFR BLD AUTO: 29 %
MCH RBC QN AUTO: 24.9 PG (ref 26.6–33)
MCHC RBC AUTO-ENTMCNC: 30.7 G/DL (ref 31.5–35.7)
MCV RBC AUTO: 81 FL (ref 79–97)
MONOCYTES # BLD AUTO: 0.5 X10E3/UL (ref 0.1–0.9)
MONOCYTES NFR BLD AUTO: 8 %
NEUTROPHILS # BLD AUTO: 3.5 X10E3/UL (ref 1.4–7)
NEUTROPHILS NFR BLD AUTO: 60 %
PLATELET # BLD AUTO: 334 X10E3/UL (ref 150–450)
POTASSIUM SERPL-SCNC: 4.4 MMOL/L (ref 3.5–5.2)
PROT SERPL-MCNC: 6.8 G/DL (ref 6–8.5)
RBC # BLD AUTO: 5.22 X10E6/UL (ref 3.77–5.28)
SODIUM SERPL-SCNC: 139 MMOL/L (ref 134–144)
TIBC SERPL-MCNC: 429 UG/DL (ref 250–450)
UIBC SERPL-MCNC: 386 UG/DL (ref 131–425)
WBC # BLD AUTO: 5.8 X10E3/UL (ref 3.4–10.8)

## (undated) DEVICE — PREP SOL POVIDONE/IODINE BT 4OZ

## (undated) DEVICE — MARKR SKIN W/RULR AND LBL

## (undated) DEVICE — CABL BIPOL MEGADYNE 12FT DISP

## (undated) DEVICE — PK ENT HD AND NK 30

## (undated) DEVICE — GLV SURG BIOGEL LTX PF 8

## (undated) DEVICE — SPNG GZ WOVN 4X4IN 12PLY 10/BX STRL

## (undated) DEVICE — PK TURNOVER RM ADV

## (undated) DEVICE — SUT SILK 2/0 SH 30IN K833H

## (undated) DEVICE — 3M™ STERI-STRIP™ REINFORCED ADHESIVE SKIN CLOSURES, R1547, 1/2 IN X 4 IN (12 MM X 100 MM), 6 STRIPS/ENVELOPE: Brand: 3M™ STERI-STRIP™

## (undated) DEVICE — ELECTRD NDL EZ CLN MOD 2.75IN